# Patient Record
Sex: FEMALE | Race: WHITE | Employment: UNEMPLOYED | URBAN - METROPOLITAN AREA
[De-identification: names, ages, dates, MRNs, and addresses within clinical notes are randomized per-mention and may not be internally consistent; named-entity substitution may affect disease eponyms.]

---

## 2017-01-01 ENCOUNTER — HOSPITAL ENCOUNTER (INPATIENT)
Age: 47
LOS: 3 days | DRG: 375 | End: 2017-01-04
Attending: INTERNAL MEDICINE | Admitting: INTERNAL MEDICINE
Payer: OTHER MISCELLANEOUS

## 2017-01-01 ENCOUNTER — HOME CARE VISIT (OUTPATIENT)
Dept: HOSPICE | Facility: HOSPICE | Age: 47
End: 2017-01-01
Payer: MEDICAID

## 2017-01-01 ENCOUNTER — APPOINTMENT (OUTPATIENT)
Dept: GENERAL RADIOLOGY | Age: 47
DRG: 375 | End: 2017-01-01
Attending: INTERNAL MEDICINE
Payer: OTHER MISCELLANEOUS

## 2017-01-01 ENCOUNTER — DOCUMENTATION ONLY (OUTPATIENT)
Dept: PALLATIVE CARE | Age: 47
End: 2017-01-01

## 2017-01-01 ENCOUNTER — HOSPICE ADMISSION (OUTPATIENT)
Dept: HOSPICE | Facility: HOSPICE | Age: 47
End: 2017-01-01
Payer: MEDICAID

## 2017-01-01 VITALS
SYSTOLIC BLOOD PRESSURE: 114 MMHG | HEART RATE: 108 BPM | RESPIRATION RATE: 18 BRPM | OXYGEN SATURATION: 95 % | DIASTOLIC BLOOD PRESSURE: 77 MMHG | TEMPERATURE: 97.4 F

## 2017-01-01 DIAGNOSIS — R10.9 INTRACTABLE ABDOMINAL PAIN: ICD-10-CM

## 2017-01-01 DIAGNOSIS — K56.609 SBO (SMALL BOWEL OBSTRUCTION) (HCC): ICD-10-CM

## 2017-01-01 PROBLEM — C18.9 COLON CANCER (HCC): Status: ACTIVE | Noted: 2017-01-01

## 2017-01-01 LAB
GLUCOSE BLD STRIP.AUTO-MCNC: 97 MG/DL (ref 65–100)
SERVICE CMNT-IMP: NORMAL

## 2017-01-01 PROCEDURE — C9113 INJ PANTOPRAZOLE SODIUM, VIA: HCPCS | Performed by: INTERNAL MEDICINE

## 2017-01-01 PROCEDURE — G0299 HHS/HOSPICE OF RN EA 15 MIN: HCPCS

## 2017-01-01 PROCEDURE — 74011250636 HC RX REV CODE- 250/636: Performed by: INTERNAL MEDICINE

## 2017-01-01 PROCEDURE — 0651 HSPC ROUTINE HOME CARE

## 2017-01-01 PROCEDURE — 74011000250 HC RX REV CODE- 250: Performed by: INTERNAL MEDICINE

## 2017-01-01 PROCEDURE — 82962 GLUCOSE BLOOD TEST: CPT

## 2017-01-01 PROCEDURE — 0656 HSPC GENERAL INPATIENT

## 2017-01-01 PROCEDURE — 65270000015 HC RM PRIVATE ONCOLOGY

## 2017-01-01 PROCEDURE — 3336500001 HSPC ELECTION

## 2017-01-01 PROCEDURE — 74011250637 HC RX REV CODE- 250/637: Performed by: INTERNAL MEDICINE

## 2017-01-01 PROCEDURE — 74000 XR ABD (KUB): CPT

## 2017-01-01 RX ORDER — ONDANSETRON 2 MG/ML
4 INJECTION INTRAMUSCULAR; INTRAVENOUS
Status: DISCONTINUED | OUTPATIENT
Start: 2017-01-01 | End: 2017-01-01 | Stop reason: SDUPTHER

## 2017-01-01 RX ORDER — DEXAMETHASONE 4 MG/1
2 TABLET ORAL EVERY 12 HOURS
Status: DISCONTINUED | OUTPATIENT
Start: 2017-01-01 | End: 2017-01-01

## 2017-01-01 RX ORDER — LORAZEPAM 2 MG/ML
0.5 INJECTION INTRAMUSCULAR EVERY 4 HOURS
Status: DISCONTINUED | OUTPATIENT
Start: 2017-01-01 | End: 2017-01-05 | Stop reason: HOSPADM

## 2017-01-01 RX ORDER — LORAZEPAM 2 MG/ML
1 CONCENTRATE ORAL
Status: DISCONTINUED | OUTPATIENT
Start: 2017-01-01 | End: 2017-01-01

## 2017-01-01 RX ORDER — PANTOPRAZOLE SODIUM 40 MG/1
40 TABLET, DELAYED RELEASE ORAL
Status: DISCONTINUED | OUTPATIENT
Start: 2017-01-01 | End: 2017-01-01

## 2017-01-01 RX ORDER — DEXAMETHASONE SODIUM PHOSPHATE 4 MG/ML
2 INJECTION, SOLUTION INTRA-ARTICULAR; INTRALESIONAL; INTRAMUSCULAR; INTRAVENOUS; SOFT TISSUE EVERY 12 HOURS
Status: DISCONTINUED | OUTPATIENT
Start: 2017-01-01 | End: 2017-01-01

## 2017-01-01 RX ORDER — ONDANSETRON 2 MG/ML
4 INJECTION INTRAMUSCULAR; INTRAVENOUS
Status: DISCONTINUED | OUTPATIENT
Start: 2017-01-01 | End: 2017-01-05 | Stop reason: HOSPADM

## 2017-01-01 RX ORDER — SODIUM CHLORIDE 0.9 % (FLUSH) 0.9 %
10 SYRINGE (ML) INJECTION AS NEEDED
Status: DISCONTINUED | OUTPATIENT
Start: 2017-01-01 | End: 2017-01-05 | Stop reason: HOSPADM

## 2017-01-01 RX ORDER — ALPRAZOLAM 0.25 MG/1
0.25 TABLET ORAL
Status: DISCONTINUED | OUTPATIENT
Start: 2017-01-01 | End: 2017-01-01

## 2017-01-01 RX ORDER — ONDANSETRON 2 MG/ML
4 INJECTION INTRAMUSCULAR; INTRAVENOUS EVERY 8 HOURS
Status: DISCONTINUED | OUTPATIENT
Start: 2017-01-01 | End: 2017-01-01

## 2017-01-01 RX ORDER — SODIUM CHLORIDE 9 MG/ML
75 INJECTION, SOLUTION INTRAVENOUS CONTINUOUS
Status: DISCONTINUED | OUTPATIENT
Start: 2017-01-01 | End: 2017-01-01

## 2017-01-01 RX ORDER — LORAZEPAM 2 MG/ML
1 INJECTION INTRAMUSCULAR
Status: DISCONTINUED | OUTPATIENT
Start: 2017-01-01 | End: 2017-01-05 | Stop reason: HOSPADM

## 2017-01-01 RX ORDER — HYDROMORPHONE HYDROCHLORIDE 2 MG/ML
1 INJECTION, SOLUTION INTRAMUSCULAR; INTRAVENOUS; SUBCUTANEOUS
Status: DISCONTINUED | OUTPATIENT
Start: 2017-01-01 | End: 2017-01-01

## 2017-01-01 RX ORDER — LORAZEPAM 2 MG/ML
0.5 INJECTION INTRAMUSCULAR
Status: DISCONTINUED | OUTPATIENT
Start: 2017-01-01 | End: 2017-01-05 | Stop reason: HOSPADM

## 2017-01-01 RX ORDER — HYDROMORPHONE HYDROCHLORIDE 1 MG/ML
1 INJECTION, SOLUTION INTRAMUSCULAR; INTRAVENOUS; SUBCUTANEOUS ONCE
Status: COMPLETED | OUTPATIENT
Start: 2017-01-01 | End: 2017-01-01

## 2017-01-01 RX ORDER — FACIAL-BODY WIPES
10 EACH TOPICAL DAILY PRN
Status: DISCONTINUED | OUTPATIENT
Start: 2017-01-01 | End: 2017-01-05 | Stop reason: HOSPADM

## 2017-01-01 RX ORDER — HYDROMORPHONE HCL IN 0.9% NACL 15 MG/30ML
PATIENT CONTROLLED ANALGESIA VIAL INTRAVENOUS CONTINUOUS
Status: DISCONTINUED | OUTPATIENT
Start: 2017-01-01 | End: 2017-01-05 | Stop reason: HOSPADM

## 2017-01-01 RX ORDER — ONDANSETRON 2 MG/ML
8 INJECTION INTRAMUSCULAR; INTRAVENOUS EVERY 8 HOURS
Status: DISCONTINUED | OUTPATIENT
Start: 2017-01-01 | End: 2017-01-05 | Stop reason: HOSPADM

## 2017-01-01 RX ORDER — HYDROMORPHONE HYDROCHLORIDE 1 MG/ML
1 INJECTION, SOLUTION INTRAMUSCULAR; INTRAVENOUS; SUBCUTANEOUS
Status: DISCONTINUED | OUTPATIENT
Start: 2017-01-01 | End: 2017-01-05 | Stop reason: HOSPADM

## 2017-01-01 RX ADMIN — HYDROMORPHONE HYDROCHLORIDE 1 MG: 1 INJECTION, SOLUTION INTRAMUSCULAR; INTRAVENOUS; SUBCUTANEOUS at 03:29

## 2017-01-01 RX ADMIN — ONDANSETRON 8 MG: 2 INJECTION INTRAMUSCULAR; INTRAVENOUS at 15:10

## 2017-01-01 RX ADMIN — SODIUM CHLORIDE 40 MG: 9 INJECTION INTRAMUSCULAR; INTRAVENOUS; SUBCUTANEOUS at 08:30

## 2017-01-01 RX ADMIN — ONDANSETRON 4 MG: 2 INJECTION INTRAMUSCULAR; INTRAVENOUS at 23:01

## 2017-01-01 RX ADMIN — SODIUM CHLORIDE 75 ML/HR: 900 INJECTION, SOLUTION INTRAVENOUS at 01:49

## 2017-01-01 RX ADMIN — Medication: at 17:45

## 2017-01-01 RX ADMIN — LORAZEPAM 0.5 MG: 2 INJECTION INTRAMUSCULAR; INTRAVENOUS at 23:18

## 2017-01-01 RX ADMIN — Medication 10 ML: at 20:41

## 2017-01-01 RX ADMIN — LORAZEPAM 0.5 MG: 2 INJECTION INTRAMUSCULAR; INTRAVENOUS at 05:08

## 2017-01-01 RX ADMIN — ALPRAZOLAM 0.25 MG: 0.25 TABLET ORAL at 10:52

## 2017-01-01 RX ADMIN — SODIUM CHLORIDE 5 MG: 9 INJECTION INTRAMUSCULAR; INTRAVENOUS; SUBCUTANEOUS at 23:29

## 2017-01-01 RX ADMIN — HYDROMORPHONE HYDROCHLORIDE 1 MG: 1 INJECTION, SOLUTION INTRAMUSCULAR; INTRAVENOUS; SUBCUTANEOUS at 17:10

## 2017-01-01 RX ADMIN — LORAZEPAM 1 MG: 2 INJECTION INTRAMUSCULAR; INTRAVENOUS at 15:59

## 2017-01-01 RX ADMIN — HYDROMORPHONE HYDROCHLORIDE 1 MG: 1 INJECTION, SOLUTION INTRAMUSCULAR; INTRAVENOUS; SUBCUTANEOUS at 01:22

## 2017-01-01 RX ADMIN — DEXAMETHASONE SODIUM PHOSPHATE 2 MG: 4 INJECTION, SOLUTION INTRAMUSCULAR; INTRAVENOUS at 08:30

## 2017-01-01 RX ADMIN — LORAZEPAM 0.5 MG: 2 INJECTION INTRAMUSCULAR; INTRAVENOUS at 10:43

## 2017-01-01 RX ADMIN — DEXAMETHASONE SODIUM PHOSPHATE 2 MG: 4 INJECTION, SOLUTION INTRAMUSCULAR; INTRAVENOUS at 08:39

## 2017-01-01 RX ADMIN — ONDANSETRON 4 MG: 2 INJECTION INTRAMUSCULAR; INTRAVENOUS at 21:15

## 2017-01-01 RX ADMIN — LORAZEPAM 1 MG: 2 INJECTION INTRAMUSCULAR; INTRAVENOUS at 18:10

## 2017-01-01 RX ADMIN — ONDANSETRON 4 MG: 2 INJECTION INTRAMUSCULAR; INTRAVENOUS at 13:48

## 2017-01-01 RX ADMIN — Medication: at 17:16

## 2017-01-01 RX ADMIN — ONDANSETRON 4 MG: 2 INJECTION INTRAMUSCULAR; INTRAVENOUS at 08:30

## 2017-01-01 RX ADMIN — Medication: at 06:00

## 2017-01-01 RX ADMIN — LORAZEPAM 0.5 MG: 2 INJECTION INTRAMUSCULAR; INTRAVENOUS at 15:03

## 2017-01-01 RX ADMIN — ONDANSETRON 4 MG: 2 INJECTION INTRAMUSCULAR; INTRAVENOUS at 20:40

## 2017-01-01 RX ADMIN — LORAZEPAM 0.5 MG: 2 INJECTION INTRAMUSCULAR; INTRAVENOUS at 16:04

## 2017-01-01 RX ADMIN — DEXAMETHASONE 2 MG: 4 TABLET ORAL at 20:44

## 2017-01-01 RX ADMIN — ONDANSETRON 4 MG: 2 INJECTION INTRAMUSCULAR; INTRAVENOUS at 05:08

## 2017-01-01 RX ADMIN — HYDROMORPHONE HYDROCHLORIDE 1 MG: 1 INJECTION, SOLUTION INTRAMUSCULAR; INTRAVENOUS; SUBCUTANEOUS at 23:18

## 2017-01-01 RX ADMIN — ONDANSETRON 4 MG: 2 INJECTION INTRAMUSCULAR; INTRAVENOUS at 17:08

## 2017-01-01 RX ADMIN — LORAZEPAM 0.5 MG: 2 INJECTION INTRAMUSCULAR; INTRAVENOUS at 21:16

## 2017-01-01 RX ADMIN — ONDANSETRON 4 MG: 2 INJECTION INTRAMUSCULAR; INTRAVENOUS at 03:29

## 2017-01-01 RX ADMIN — SODIUM CHLORIDE 75 ML/HR: 900 INJECTION, SOLUTION INTRAVENOUS at 19:04

## 2017-01-01 RX ADMIN — HYDROMORPHONE HYDROCHLORIDE 1 MG: 1 INJECTION, SOLUTION INTRAMUSCULAR; INTRAVENOUS; SUBCUTANEOUS at 20:44

## 2017-01-01 RX ADMIN — Medication: at 22:47

## 2017-01-01 RX ADMIN — LORAZEPAM 0.5 MG: 2 INJECTION INTRAMUSCULAR; INTRAVENOUS at 01:29

## 2017-01-01 RX ADMIN — DEXAMETHASONE SODIUM PHOSPHATE 2 MG: 4 INJECTION, SOLUTION INTRAMUSCULAR; INTRAVENOUS at 20:40

## 2017-01-01 RX ADMIN — ONDANSETRON 4 MG: 2 INJECTION INTRAMUSCULAR; INTRAVENOUS at 14:39

## 2017-01-01 RX ADMIN — HYDROMORPHONE HYDROCHLORIDE 1 MG: 1 INJECTION, SOLUTION INTRAMUSCULAR; INTRAVENOUS; SUBCUTANEOUS at 22:25

## 2017-01-01 RX ADMIN — SODIUM CHLORIDE 40 MG: 9 INJECTION INTRAMUSCULAR; INTRAVENOUS; SUBCUTANEOUS at 08:39

## 2017-01-01 RX ADMIN — BISACODYL 10 MG: 10 SUPPOSITORY RECTAL at 23:20

## 2017-01-01 RX ADMIN — ONDANSETRON 4 MG: 2 INJECTION INTRAMUSCULAR; INTRAVENOUS at 10:52

## 2017-01-01 NOTE — H&P
[facility] Help to Those in Need  [phone]    Patient Name: Sharona Nicole  YOB: 1970    Date of Provider Hospice Visit: 01/01/17    Level of Care:   [x] General Inpatient (GIP)    [] Routine   [] Respite    Location of Care:  [] [facility] [] [facility] [x] [facility] [] [facility] [] [facility]  [] Home [] Other:      Date of Hospice Admission: 1-1-17  Hospice Attending: Dr. Marimar Addison Diagnosis: Metastatic colon cancer  Diagnoses RELATED to the terminal prognosis: partial small bowel obstruction, b/l ovarian masses, peritoneal carcinomatosis  Other Diagnoses: abdominal ascites     HOSPICE NARRATIVE COMPOSED BY PHYSICIAN   Rationale for a prognosis of life expectancy of 6 months or less if the disease follows its normal course:  Sharona Nicole is a 51y.o. year old who was admitted to [address] [place] [address]. The patient's principle diagnosis of metastatic colon cancer  has resulted in severe uncontrolled pain and nausea, PSBO with 2 hospitalizations for pain and PSBO last month and ED visit 2 days ago. Emesis x 2 today. Pt Functionally, the patient's Palliative Performance Scale has declined over a period of weeks and is estimated at 50. Objective information that support this patients limited prognosis includes: CT scan 12/23/16   IMPRESSION  IMPRESSION:   Findings are consistent with early small bowel obstruction with transition point  in the pelvis. The ascending colon is decompressed. Bilateral ovarian masses  with peritoneal carcinomatosis and increased complex ascites.        The patient/family chose comfort measures with the support of Hospice.     Attestation: I confirm that I composed this narrative as the physician and is based on my review of the patient's medical record and/or examination of the        HOSPICE DIAGNOSES   Active Symptoms:  1. Severe lower abdominal pain  2. Nausea  3. anxiety  4. Distended abdomen  5. Hx PSBO     PLAN        1.  Admit GIP hospice for severe pain , nausea, abdominal distention not controlled with oxycontin, dilaudid and  zofran  2. Iv dilaudid 1mg now and then start PCA dilaudid -.4 mg/hr with 0.4 mg bolus q10 min prn  3. Iv zofran with dilaudid now and then prn  4. NPO  5. Start IVF  6. Will ask Dr. Mayra Cabezas or associate to see in am . Pt/family reports that was to have f/u Dr. Mayra Cabezas next week. Discuss goals of care again. 7.   and SW to support family needs  8. Disposition: to home with hospice when sxs controlled    Prognosis estimated based on 01/01/17 clinical assessment is:   [] Few to Many Hours  [] Few to Many Days    [x] Few to Many Weeks    [] Few to Many Months    Communicated plan of care with: Hospice Case Manager; Hospice IDT; Care Team     GOALS OF CARE     Resuscitation Status: DNR  Durable DNR: [] Yes [] No    Advance Care Planning 12/23/2016   Patient's Healthcare Decision Maker is: Verbal statement (Legal Next of Kin remains as decision maker)   Primary Decision Maker Name Anant Gutiérrez   Primary Decision Maker Phone Number [phone]   Primary Decision Maker Relationship to Patient Parent   Confirm Advance Directive Yes, on file   Does the patient have other document types Power of [place]; Do Not Resuscitate        HISTORY     History obtained from: chart, patient and family    CHIEF COMPLAINT: pain and nausea and anxiety  The patient is:   [x] Verbal  [] Nonverbal  [] Unresponsive    HPI/SUBJECTIVE:      Went to Ed 2 days ago for pain and nausea. Sent home and feeling worse. Pain in abdomen in 9/10  emsis x 2 today. Unable to eat last 3 days.  Taking oxycontin 40mg bid and dilaudid 8 mg q4 hrs at home  Pt elects hospice care \" nothing further can be done\"  Pain rating 7/10 after dilaudid IV given       REVIEW OF SYSTEMS     The following systems were: [x] reviewed  [] unable to be reviewed    Positive ROS include:  Constitutional:  Ears/nose/mouth/throat:  Respiratory:  Gastrointestinal: pain and distenesion and nausea  Musculoskeletal:  Neurologic:anxious  Psychiatric:  Endocrine:     Adult Non-Verbal Pain Assessment Score: 7    Face  [] 0   No particular expression or smile  [] 1   Occasional grimace, tearing, frowning, wrinkled forehead  [x] 2   Frequent grimace, tearing, frowning, wrinkled forehead    Activity (movement)  [] 0   Lying quietly, normal position  [] 1   Seeking attention through movement or slow, cautious movement  [x] 2   Restless, excessive activity and/or withdrawal reflexes    Guarding  [] 0   Lying quietly, no positioning of hands over areas of body  [] 1   Splinting areas of the body, tense  [x] 2   Rigid, stiff    Physiology (vital signs)  [] 0   Stable vital signs  [x] 1   Change in any of the following: SBP > 20mm Hg; HR > 20/minute  [] 2   Change in any of the following: SBP > 30mm Hg; HR > 25/minute    Respiratory  [x] 0   Baseline RR/SpO2, compliant with ventilator  [] 1   RR > 10 above baseline, or 5% drop SpO2, mild asynchrony with ventilator  [] 2   RR > 20 above baseline, or 10% drop SpO2, asynchrony with ventilator     FUNCTIONAL ASSESSMENT     Palliative Performance Scale (PPS):50     PSYCHOSOCIAL/SPIRITUAL ASSESSMENT     Active Problems:    Colon cancer (HCC) (2017)      Past Medical History   Diagnosis Date    Cancer [facility])      Colon 3C    [facility]varian [facility] [address]     Psychiatric disorder      Clinical Depression      Past Surgical History   Procedure Laterality Date    Hx  section      Hx appendectomy      Hx bunionectomy      Hx septoplasty      Hx colectomy       12 inches removed, no colostomy      Social History   Substance Use Topics    Smoking status: Never Smoker    Smokeless tobacco: Never Used    Alcohol use No      Comment: Denies at this time.       Family History   Problem Relation Age of Onset    Psychiatric Disorder Mother     Hypertension Mother     Arthritis-osteo Father     Diabetes Brother     Hypertension Brother     Headache Maternal Aunt       Allergies   Allergen Reactions    Asparagus Nausea and Vomiting    Codeine Photosensitivity    Other Medication Rash     oxaliplatin    Percocet [Oxycodone-Acetaminophen] Other (comments)     Hallucinations       Current Facility-Administered Medications   Medication Dose Route Frequency    ondansetron (ZOFRAN) injection 4 mg  4 mg IntraVENous Q4H PRN    HYDROmorphone (PF) (DILAUDID) injection 1 mg  1 mg IntraVENous Q1H PRN    HYDROmorphone (PF) 15 mg/30 ml (DILAUDID) PCA   IntraVENous CONTINUOUS    0.9% sodium chloride infusion  75 mL/hr IntraVENous CONTINUOUS        PHYSICAL EXAM     Wt Readings from Last 3 Encounters:   12/30/16 135 lb (61.2 kg)   12/23/16 131 lb 6.3 oz (59.6 kg)   12/04/16 135 lb (61.2 kg)       Visit Vitals    /87 (BP 1 Location: Left arm, BP Patient Position: At rest)    Pulse (!) 106    Temp 97.5 °F (36.4 °C)    Resp 16    SpO2 97%       Supplemental O2  [] Yes  [x] NO  Last bowel movement:     Currently this patient has:  [] Peripheral IV [] PICC  [x] PORT [] ICD    [] Amin Catheter [] NG Tube   [] PEG Tube    [] Rectal Tube [] Drain  [] Other:     Constitutional: restless , clutching abdomen and moaning  Eyes: perrl  ENMT: clear rey  Cardiovascular: rrr  Respiratory: clear  Gastrointestinal: distended upper abdomen, firm  Musculoskeletal:ankle edema b/l  Skin: warm, dry  Neurologic: a x o x 3  Psychiatric:   Other:    Pertinent Lab and or Imaging Tests:  Lab Results   Component Value Date/Time    Sodium 136 12/30/2016 07:30 PM    Potassium 3.7 12/30/2016 07:30 PM    Chloride 97 12/30/2016 07:30 PM    CO2 28 12/30/2016 07:30 PM    Anion gap 11 12/30/2016 07:30 PM    Glucose 134 12/30/2016 07:30 PM    BUN 8 12/30/2016 07:30 PM    Creatinine 0.74 12/30/2016 07:30 PM    BUN/Creatinine ratio 11 12/30/2016 07:30 PM    GFR est AA >60 12/30/2016 07:30 PM    GFR est non-AA >60 12/30/2016 07:30 PM    Calcium 9.4 12/30/2016 07:30 PM     Lab Results Component Value Date/Time    Protein, total 8.5 12/30/2016 07:30 PM    Albumin 3.6 12/30/2016 07:30 PM           Total time: 60 min  Counseling / coordination time: d/w palliative MD hospice RN, floor RN, pt, family and on call oncologist  > 50% counseling / coordination?: yes

## 2017-01-01 NOTE — CERTIFICATE OF TERMINAL ILLNESS
Hospice Physician Admission Narrative   (Certification of Terminal Illness)    [facility]  Good Help to Those in Need  [phone]    Lisa Bullock    Date of Hospice Admission: 1-1-17  Hospice Attending: Dr. Vijay Nicholas Diagnosis: Metastatic colon cancer  Diagnoses RELATED to the terminal prognosis: partial small bowel obstruction, b/l ovarian masses, peritoneal carcinomatosis  Other Diagnoses: abdominal ascites     HOSPICE NARRATIVE COMPOSED BY PHYSICIAN   Rationale for a prognosis of life expectancy of 6 months or less if the disease follows its normal course:    Lisa Bullock is a 51y.o. year old who was admitted to [facility]. The patient's principle diagnosis of metastatic colon cancer  has resulted in severe uncontrolled pain and nausea, PSBO with 2 hospitalizations for pain and PSBO last month and ED visit 2 days ago. Emesis x 2 today. Pt Functionally, the patient's Palliative Performance Scale has declined over a period of weeks and is estimated at 50. Objective information that support this patients limited prognosis includes: CT scan 12/23/16   IMPRESSION  IMPRESSION:   Findings are consistent with early small bowel obstruction with transition point  in the pelvis. The ascending colon is decompressed. Bilateral ovarian masses  with peritoneal carcinomatosis and increased complex ascites. The patient/family chose comfort measures with the support of Hospice. Attestation: I confirm that I composed this narrative as the physician and is based on my review of the patient's medical record and/or examination of the patient. ______________________________________________________________________    [facility] LCD for Hospice Diagnosis (.hospicecriteria. ..)  The LCD for Hospice for the admitted diagnosis of cancer includes:    [] Disease with distant metastases at presentation OR    [x] Progression from an earlier stage of disease to metastatic disease with either:   [x] a continued decline in spite of therapy   [x] patient declines further disease directed therapy    [] Certain cancers with poor prognoses (e.g. small cell lung cancer, brain cancer and pancreatic cancer) may be hospice eligible without fulfilling the other criteria in this section.

## 2017-01-01 NOTE — PROGRESS NOTES
Patient called me back. Continues to have increasing pain despite current management. Patient frustrated that pain not being controlled. We discussed all options to include me calling the Hospitalist for direct admission to hospital vs transition to Hospice care. Patient states very poor po intake, increasing abdominal distension and at this point, \"just wants to be knocked out\". After conferring with her her family, she would like to proceed with Hospice care. I then talked with Ketty Urbano and Dr. Mar Simpson about GIP admission. Talked with nursing supervisor and they will secure a bed for patient and Hospice team will admit. Patient will go to ED registration and then be directed to the bed.

## 2017-01-01 NOTE — PROGRESS NOTES
Oncology Nursing Communication Tool      6:52 PM  1/1/2017     Bedside shift change report given to Travis Norton RN (incoming nurse) by Cinthya Carter RN (outgoing nurse) on Ty Simpler a 51 y.o. female who was admitted on 1/1/2017  4:54 PM. Report included the following information SBAR and Kardex. Significant changes during shift: pca started. Issues for physician to address: none            Code Status: DNR     Infections: No current active infections     Allergies: Asparagus; Codeine; Other medication; and Percocet [oxycodone-acetaminophen]     Current diet: DIET NPO       Pain Controlled [] yes [x] no   Bowel Movement [] yes [x] no   Last Bowel Movement (date) ? Vital Signs:   Patient Vitals for the past 12 hrs:   Temp Pulse Resp BP SpO2   01/01/17 1545 97.5 °F (36.4 °C) (!) 106 16 129/87 97 %      Intake & Output:   No intake or output data in the 24 hours ending 01/01/17 1852   Laboratory Results:   No results found for this or any previous visit (from the past 12 hour(s)). Opportunity for questions and clarifications were given to the incoming nurse. Patient's bed is in low position, side rails x2, door open PRN, call bell within reach and patient not in distress.       Cinthya Carter RN

## 2017-01-02 NOTE — HOSPICE
[facility]  Help to Those in Need  [phone]    Inpatient Nursing Admission   Patient Name: Spenser Gibson  YOB: 1970  Age: 51 y.o. Date of Hospice Admission: 1/1/2017  Hospice Attending: Reggie Mckenzie MD  Primary Care Physician: Bee Russo MD  Admitting RN:  Keshav Masters RN BSN [facility]  :  Sara Mcdaniel present     Level of Care (GIP/Routine/Respite): GIP  [facility]  Patient Room: [address] [place] [address]   (if patient transferred from [facility]): HOSPICE SUMMARY   ER Visits/ Hospitalizations in past year:   4 ER visits in the past month with most recent Friday night as well as at least one 2 day hospital stay a week ago all related per patient report to symptom management  Hospice Diagnosis: Metastatic colon cancer  Colon cancer ([facility] [address]  Colon cancer ([facility] [address]  Onset Date of Hospice Diagnosis:     Co-Morbidities:   Patient Active Problem List   Diagnosis Code    Single current episode of major depressive disorder F32.9    History of colon cancer in adulthood Z85.038    Clostridium difficile carrier Z22.1    Intractable abdominal pain R10.9    SBO (small bowel obstruction) (HCC) K56.69    Nausea R11.0    Anxiety F41.9    Depression F32.9    Colon cancer (HCC) C18.9     Diagnoses RELATED to the terminal prognosis: small bowel obstruction  Other Diagnoses: Severe abdominal distension, nausea, anxiety    Rationale for a prognosis of life expectancy of 6 months or less if the disease follows its normal course (Disease Specific History):     Per Dr Hickman Neat note:  Rationale for a prognosis of life expectancy of 6 months or less if the disease follows its normal course:  Spenser Gibson is a 51y.o. year old who was admitted to [facility].  The patient's principle diagnosis of metastatic colon cancer has resulted in severe uncontrolled pain and nausea, PSBO with 2 hospitalizations for pain and PSBO last month and ED visit 2 days ago. Emesis x 2 today. Pt Functionally, the patient's Palliative Performance Scale has declined over a period of weeks and is estimated at 50. Objective information that support this patients limited prognosis includes: CT scan 12/23/16   IMPRESSION  IMPRESSION:   Findings are consistent with early small bowel obstruction with transition point  in the pelvis. The ascending colon is decompressed. Bilateral ovarian masses  with peritoneal carcinomatosis and increased complex ascites.        The patient/family chose comfort measures with the support of Hospice. Prognosis estimated based on 01/01/17 clinical assessment is:   _ Few to Many Hours  _ Few to Many Days   x_ Few to Many Weeks    _ Few to Many Months  (Erase what you do not select)    ADVANCE CARE PLANNING (Complete in ACP Flow Sheet)   Code Status: DNR   Durable DNR: x_ Yes  _ No  Advance Care Planning 1/1/2017   Patient's Healthcare Decision Maker is: Verbal statement (Legal Next of Kin remains as decision maker)   Primary Decision Maker Name -   Primary Decision Maker Phone Number -   Primary Decision Maker Relationship to Patient -   Confirm Advance Directive Yes, not on file   Does the patient have other document types -        Service: _ Yes  _ No      _ Unknown  Appropriate for Fashion Movement Ceremony:  _ Yes     _ No  Presybeterian: UNITARIAN    ASSESSMENT    Quality Measure: Patient self-reports:  x_ Yes    _ No    SYMPTOMS: Pain, nausea, vomiting, constipation, reflux, anxiety    ESAS:   Time of Assessment: 5:00 pm  Pain (1-10):  9.5  Shortness of breath (1-10): 2  Nausea (1-10): 9  Constipation: x_ Yes  _ No    FALL RISK:  _ Low   _x Moderate    _ High    _ Not able to assess    KARNOFSKY: 50    PRESSURE ULCERS   Sean Scale (pressure ulcer risk):      FAST for all dementia:     Progression to DEPENDENCE WITH ADLs (include time frame):   Patient has been primarily independent for most ADLs.  Now due to symtpom load ambulating very short distances  _ Dependent for bathing: personal hygiene and grooming   _ Dependent for dressing: dressing and undressing   _ Dependent for transferring: movement and mobility   _ Dependent for toileting: continence-related tasks including control and hygiene   _ Dependent for eating: preparing food and feeding    CLINICAL INFORMATION   Mid-arm Circumference (MAC):        Wt Readings from Last 3 Encounters:   12/30/16 61.2 kg (135 lb)   12/23/16 59.6 kg (131 lb 6.3 oz)   12/04/16 61.2 kg (135 lb)     There is no height or weight on file to calculate BMI. Visit Vitals    /87 (BP 1 Location: Left arm, BP Patient Position: At rest)    Pulse (!) 106    Temp 97.5 °F (36.4 °C)    Resp 16    SpO2 97%       Currently this patient has:  x_ Supplemental O2 _ Peripheral IV _ PICC    x_ PORT   x_ Amin Catheter if needed  _ NG Tube   _ PEG Tube _ Ostomy    _ ICD: Has ICD been deactivated? SIGNS/PHYSICAL FINDINGS: Patient in hospital bed in kneeling postiion; doubled over in pain, nauseous, anxious, verbal limited due to symptom crisis/most especially unreleived nausea, vomiting. 53 yo woman moving all extremities, alert and oriented. Moaning in pain. Reporting pain/discomfort as a 9.5  Chest clear, right subclavian port palated, abdomen large, taut, no bowel sounds ausculated. Patient had not voided in several hours. Passed very small hard stool earlier today prior to admission. LAB VALUES  No results found for this visit on 01/01/17 (from the past 12 hour(s)). No results found for this visit on 01/01/17 (from the past 6 hour(s)). Lab Results   Component Value Date/Time    Protein, total 8.5 12/30/2016 07:30 PM    Albumin 3.6 12/30/2016 07:30 PM       ALLERGIES AND MEDICATIONS     Allergies:    Allergies   Allergen Reactions    Asparagus Nausea and Vomiting    Codeine Photosensitivity    Other Medication Rash     oxaliplatin    Percocet [Oxycodone-Acetaminophen] Other (comments)     Hallucinations Current Facility-Administered Medications   Medication Dose Route Frequency    HYDROmorphone (PF) (DILAUDID) injection 1 mg  1 mg IntraVENous Q1H PRN    HYDROmorphone (PF) 15 mg/30 ml (DILAUDID) PCA   IntraVENous CONTINUOUS    0.9% sodium chloride infusion  75 mL/hr IntraVENous CONTINUOUS    ondansetron (ZOFRAN) injection 4 mg  4 mg IntraVENous Q4H PRN    bisacodyl (DULCOLAX) suppository 10 mg  10 mg Rectal DAILY PRN    dexamethasone (DECADRON) 4 mg/mL injection 2 mg  2 mg IntraVENous Q12H    LORazepam (ATIVAN) injection 0.5 mg  0.5 mg IntraVENous Q1H PRN    [START ON 1/2/2017] pantoprazole (PROTONIX) 40 mg in sodium chloride 0.9 % 10 mL injection  40 mg IntraVENous DAILY    sodium chloride (NS) flush 10 mL  10 mL InterCATHeter PRN    prochlorperazine (COMPAZINE) with saline injection 5 mg  5 mg IntraVENous Q6H PRN       ASSESSMENT & PLAN     HOSPICE DIAGNOSES   Active Symptoms:  1. Severe lower abdominal pain  2. Nausea  3. anxiety  4. Distended abdomen/constipation  5. Hx PSBO  6. Reflux     PLAN           1. Admit GIP hospice for severe pain , nausea, abdominal distention not controlled with oxycontin, dilaudid and  zofran  2. Iv dilaudid 1mg now and then start PCA dilaudid -.4 mg/hr with 0.4 mg bolus q10 min prn  3. Iv zofran with dilaudid now and then prn  4. NPO  5. Start IVF  6. Consult ordered per Dr Lizzy Macias for  Dr. Tasia Cotto  or associate to see in am . Pt/family reports that was to have f/u Dr. Mert [place] next week. Discuss goals of care again. 7.  and SW to support family needs  8. Disposition: to home with hospice when sxs controlled   9. Bladder scan for non void for several hours and arredondo if needed  10. IV Protonix for reflux  11. Bisacodyl supp tonight  12.  Have team socialworker follow up regarding patient long term goals home hospice vs chemotherapy         CAREGIVER SUPPORT:  x_ Provided information on End of Life Care   _ Material Provided: Gone From My Sight or Journey's End     1. FALL INTERVENTIONS PROVIDED (if the fall risk is >10,  the intervention below was provided)  _ Implement/recommend assistive devices and encouraged their use (use full bed rails, etc)  x_ Implemented/recommended resources for alar, system (personal alarm, bed alarm, call bell, etc)  x_  Implemented/recommended environmental changes (remove hazards, lower bed, improper lighting, etc.)  x_ Implemented/recommended increased supervision/assistance  _ Scheduled PT and/or OT evaluation for mobility/transfer techniques and/or assistive device recommendations    2. Initial Bereavement POC  _  LOW RISK Indications: normal grief, good support, opens expression. _x MODERATE RISK Indications: grief normal but severe, depression, somatic distress, low support  _ HIGH RISK indications: Hx of mental illness, suicidal ideation, depression, somatic distress, excessive guilt or anger, multiple losses, other life crisis. 3. Oxygen SAFETY and Anticoagulation SAFETY: Only if being transferred to home environment    4. Copies of Election of Benefit and Hospice Consent:  _ Hospice Folder Provided  _ See [place] [id number] Records for past medical records    5. Spiritual Issues Identified:     6. Psych/ Social/ Emotional Issues Identified: Elderly parents very tearful reporting that they came out of [facility], bought a house, to care for patient and patient's son Salty Cabrera. Parents had not eaten all day:  Ordered dinner trays from dietary. Offered actived listening and requested hospice MATTY ferro to visit. Newt Gowers MSW agreed and arrived to offer support to family and patient. 7.  Care Coordination:   _ Dr. Aimee Martinez contacted and was present for admission, medication reconciliation, hospice services and treatment  MEDS: See medication list above  DME: Per hospital/hospice house  Supplies: Per hospital/hospice house  [place] [facility]:     9.   Hospice Team Orders  x_ Skilled Nurse -  daily while admitted to hospital / hospice house  x_ MSW  1 visit and then prn for assessment/evaluation for family support and need for volunteer services  X  1 visit and then prn

## 2017-01-02 NOTE — PROGRESS NOTES
Music Therapy Assessment    Santosh Thomas [id number]  [id number]    1970  51 y.o.  female    Patient Telephone Number: [phone] (home)   Buddhist Affiliation: Ghanshyam Decker   Language: English   Extended Emergency Contact Information  Primary Emergency Contact: [address]  Mobile Phone: [phone]  Relation: Mother   Patient Active Problem List    Diagnosis Date Noted    [facility] 01/01/2017    Nausea 12/24/2016    Anxiety 12/24/2016    Depression 12/24/2016    SBO (small bowel obstruction) ([facility] 12/23/2016    Clostridium difficile carrier 12/07/2016    Intractable abdominal pain 12/07/2016    History of colon cancer in adulthood 09/19/2016    Single current episode of major depressive disorder 09/16/2016        Date: 1/2/2017       Mental Status:  [x] Alert  [ ] Forgetful  [ ] Confused      Communication Status: [ ] Impaired Speech  [ ] Nonverbal      Physical Status: [ ] Hard of Hearing  [ ] Oxygen in use  [ ] Ambulatory   [ ] Ambulatory with assistance   [ ] Non-ambulatory -N/A     Music Preferences, Background: Patient has a wide variety of music genres she enjoys. She named upbeat dance music, New Age, Classical. She said she listens to music around the house and when exercising, so chooses music based on her mood. She took piano lessons as a child, tried the classical guitar in college, and sang in her college choir.      Clinical Problem to be addressed: Promote relaxation, decrease feelings of stress. Goal(s) met in session:  Physical/Pain management (Scale of 1-10):    Pre-session rating: Pt denied pain. Post-session rating: Pt denied pain.    [x] Increased relaxation   [  ] Regulated breathing patterns  [  ] Minimized physical distress   [  ] Decreased nausea discomfort     Emotional/Psychological:  [x] Expression of feelings   [  ] Decreased aggressive behavior   [  ] Decreased sadness   [  ] Increased range of coping skills     [  ] Improved mood    [  ] Decreased withdrawn behavior     Social:  [  ] Decreased feelings of isolation  [x] Positive social interaction   [  ] Improved strained family relationships:     Spiritual:  [x] Spiritual support    [x] Expressed peace     Techniques Utilized (Check all that apply):   [  ] Procedural support MT [x] Music for relaxation [  ] Patient preferred music  [  ] Elvi analysis  [  ] Song choice  [  ] Music for validation  [  ] Music listening  [  ] Progressive relaxation [x] Guided imagery  [  ] Yon Laming  [  ] Erum Roberts   [  ] Minerva Stout writing  [  ] Wilber Jose along   [  ] Kaur Ravindra  [  ] Sensory stimulation  [x] Active Listening  [  ] Music for spiritual support [  ] Making of CDs as gifts    Session Observations:  F/u visit; The patient (pt) was observed to be comfortably lying in bed. She shared she'd just received medications for pain and nausea. She chose to have music therapy for relaxation. This music therapist (MT) played the guitar in a finger picking pattern while speaking the pt through deep breathing exercises and guided meditation with imagery. The pt increased relaxation in response to these interventions, as evidenced by (AEB) closing her eyes, her breathing deepening and the rate slowing (as seen in the rising and falling of her chest slowing), and her facial muscles relaxing. Afterward, the pt increased self-expression, AEB sharing about what she experienced during the interventions, and sharing about being at peace with what she believes is a time of transition in her life eventually leading into the next phase of her life (afterlife). She expressed gratitude for the music therapy session. Will follow as able.     Leigh Ann Fernandez MT-BC (Music Therapist-Board Certified)  Spiritual Care Department  Referral-based service

## 2017-01-02 NOTE — HOSPICE
Per request of palliative care doctor, Dr Donnie Feliz patient is being admitted to hospice at [facility] level of care. At the time of this admission patient appears to be in extreme distress with complaint of pain, nausea, vomiting, anxiety, constipation and reflux reporting that symptoms have been severe for more that one week. Patient received in acute, severe distress. Patient instructed that hospice is comfort not cure and that chemo therapy is not usually done in hospice. Patient express understanding of her condition by stating that she is dying and that there is nothing that would help. Patient has accepted hospice services at [facility]. Paged hospice counselor for support. Patient is cared for by elderly patients who also is providing care for patient's son, Yudith Daigle. As patient has been followed by oncology, IP consult written for oncology. Also, consult written for gastroenterology regarding nausea, partial bowel obstruction. Plan: admitt to hospice. As hospice room not available, asked staff to find additional chairs. Also, ordered food from kitchen for elderly parents.

## 2017-01-02 NOTE — ROUTINE PROCESS
Oncology Nursing Communication Tool      7:24 AM  1/2/2017     Bedside and Verbal shift change report given to Marzena Galindo RN (incoming nurse) by Niurka Joyce RN (outgoing nurse) on Cassandra Conner a 51 y.o. female who was admitted on 1/1/2017  4:54 PM. Report included the following information SBAR, Kardex and MAR. Significant changes during shift: none      Issues for physician to address: none            Code Status: DNR     Infections: No current active infections     Allergies: Asparagus; Codeine; Other medication; and Percocet [oxycodone-acetaminophen]     Current diet: DIET NPO       Pain Controlled [x] yes [] no   Bowel Movement [x] yes [] no   Last Bowel Movement (date)     1/2/17            Vital Signs:   Patient Vitals for the past 12 hrs:   Temp Pulse Resp BP SpO2   01/02/17 0527 98.4 °F (36.9 °C) 95 16 128/83 95 %      Intake & Output:     Intake/Output Summary (Last 24 hours) at 01/02/17 0724  Last data filed at 01/02/17 0000   Gross per 24 hour   Intake              370 ml   Output              300 ml   Net               70 ml      Laboratory Results:   No results found for this or any previous visit (from the past 12 hour(s)). Opportunity for questions and clarifications were given to the incoming nurse. Patient's bed is in low position, side rails x2, door open PRN, call bell within reach and patient not in distress.       Niurka Joyce, RN

## 2017-01-02 NOTE — PROGRESS NOTES
Oncology Nursing Communication Tool      4:25 AM  1/2/2017     Bedside and Verbal shift change report given to Irene Valentine RN (incoming nurse) by Mauricio Salgado RN (outgoing nurse) on Sergio Jasso a 51 y.o. female who was admitted on 1/1/2017  4:54 PM. Report included the following information SBAR, Kardex, [facility], Recent Results and Med Rec Status. Significant changes during shift: none      Issues for physician to address: none          Code Status: DNR     Infections: No current active infections     Allergies: Asparagus; Codeine; Other medication; and Percocet [oxycodone-acetaminophen]     Current diet: DIET NPO       Pain Controlled [x] yes [] no   Bowel Movement [] yes [x] no   Last Bowel Movement (date) unknown            Vital Signs:   No data found. Intake & Output:     Intake/Output Summary (Last 24 hours) at 01/02/17 0425  Last data filed at 01/02/17 0000   Gross per 24 hour   Intake              370 ml   Output              300 ml   Net               70 ml      Laboratory Results:   No results found for this or any previous visit (from the past 12 hour(s)). Opportunity for questions and clarifications were given to the incoming nurse. Patient's bed is in low position, side rails x2, door open PRN, call bell within reach and patient not in distress.       Mauricio Salgado RN

## 2017-01-02 NOTE — PROGRESS NOTES
Music Therapy Assessment    Jeannie Hanson [id number]  [id number]    1970  51 y.o.  female    Patient Telephone Number: [phone] (home)   Buddhism Affiliation: Jose Roberto Martinez   Language: English   Extended Emergency Contact Information  Primary Emergency Contact: [address]  Mobile Phone: [phone]  Relation: Mother   Patient Active Problem List    Diagnosis Date Noted    [facility] ([facility] 01/01/2017    Nausea 12/24/2016    Anxiety 12/24/2016    Depression 12/24/2016    SBO (small bowel obstruction) ([facility] 12/23/2016    Clostridium difficile carrier 12/07/2016    Intractable abdominal pain 12/07/2016    History of colon cancer in adulthood 09/19/2016    Single current episode of major depressive disorder 09/16/2016        Date: 1/2/2017       Mental Status:   [x] Alert [  ] Forgetful [  ]  Confused     Communication Status: [  ] Impaired Speech [  ] Nonverbal     Physical Status:   [  ] Hard of Hearing [  ] Oxygen in use [  ] Ambulatory   [  ] Ambulatory with assistance  [  ] Non-ambulatory -N/A    Music Preferences, Background: Patient has a wide variety of music genres she enjoys. She named upbeat dance music, New Age, Classical. She said she listens to music around the house and when exercising, so chooses music based on her mood. She took piano lessons as a child, tried the classical guitar in college, and sang in her college choir.     Clinical Problem to be addressed: Promote relaxation, decrease feelings of stress. Goal(s) met in session: N/A: Please see Session Observations below.   Physical/Pain management (Scale of 1-10):    Pre-session rating ___________    Post-session rating __________   [  ] Increased relaxation   [  ] Regulated breathing patterns  [  ] Minimized physical distress   [  ] Decreased nausea discomfort     Emotional/Psychological:  Expression of feelings _____________ [  ] Decreased aggressive behavior   [  ] Decreased sadness   [ ] Increased range of coping skills     [  ] Improved mood    [  ] Decreased withdrawn behavior     Social:  [  ] Decreased feelings of isolation  [  ] Positive social interaction   [  ] Improved strained family relationships:     Spiritual:  [  ] Spiritual support    [  ] Expressed peace     Techniques Utilized (Check all that apply): N/A: Please see Session Observations below. [  ] Procedural support MT [  ] Music for relaxation [  ] Patient preferred music  [  ] Elvi analysis  [  ] Song choice  [  ] Music for validation  [  ] Music listening  [  ] Progressive relaxation [  ] Guided imagery  [  ] Leonette Level  [  ] TIMP   [  ] Byron Stands writing  [  ] Erin Ropes along   [  ] Eleanor Kim  [  ] Sensory stimulation  [  ] Active Listening  [  ] Music for spiritual support [  ] Making of CDs as gifts    Session Observations:  F/u visit; The patient (pt) was observed to be sitting up in bed with her mother and brother sitting in chairs at bedside. The pt requested the music therapist follow up for a session later in the afternoon once her mother and brother have left. Will follow as able.     ANNIE DiggsBC (Music Therapist-Board Certified)  Spiritual Care Department  Referral-based service

## 2017-01-02 NOTE — HOSPICE
[facility] Note: Met with patient and her father, Apoorva Gooden, to introduce self and discuss her goals for care and what is important to her. Koko Velásquez is lucid, rated her pain at a \"2\" and despite some abdominal discomfort, stated that this is the most comfortable she has been in some time. She shared that she has been to the ER \"four times in [facility]" and she would like to avoid pain crises, returning to the hospital. She is anxious about going home and having another pain crisis as has been the cycle with her. Discussed hospice care at some length today, including hospice at home. Yesterday patient was in much discomfort and not able to participate as much in conversation. Koko Velásquez is processing what course of \"treatment\" is best for her at this time. Discussed hospice/ comfort care vs chemotherapy. Explained that hospice is her choice. Patient shared that she is leaning towards hospice as she has been informed that \"this chemo is the last resort\". She feels that she has tried chemo,  had significant pain and this has been cyclical without any resolution (of this pain). She does not want to experience pain and return to the hospital. Patient has a 15year old son at home, who is being taken care of by her parents (elderly) and other friends. Discussed with patient that it would be her decision to stay with hospice / comfort vs choosing chemotherapy if indicated. She understands this and that she is a hospice patient currently. I explained that if she wanted to go the chemo route, that she could revoke hospice. Patient is awaiting a conversation with her oncologist and a GI physician to see what their recommendations are. Patient voices a discomfort in her abdomen area, she has a possible SBO but also signifcant distension. Patient's father, Apoorva Gooden, was quiet but did state that it has been difficult to watch his daughter be in such discomfort.  Patient was diagnosed about a year and a half ago with Colon Cancer. Patient would like to be home without pain and be able to be lucid, as she is now. Explained that we could aim for that and assist her at home if she chose hospice at home. It appears that GI MD will not be able to address her issues until tomorrow morning. Hospice will follow up after this for support and goals of patient, assist with discharge as needed. Patient would like to return home.

## 2017-01-02 NOTE — PROGRESS NOTES
Primary Nurse Emelyn Membreno RN and Sangeetha Watson RN performed a dual skin assessment on this patient No impairment noted  Sean score is 19

## 2017-01-02 NOTE — H&P
[facility] Help to Those in Need  [phone]    Patient Name: Luis E Santos  YOB: 1970    Date of Provider Hospice Visit: 01/02/17    Level of Care:   [x] General Inpatient (GIP)    [] Routine   [] Respite    Location of Care:  [] [facility] [] [facility] [x] [facility] [] [facility] [] [facility]  [] Home [] Other:      Date of Hospice Admission: 1-1-17  Hospice Attending: Dr. Arjun Pemberton Diagnosis: Metastatic colon cancer  Diagnoses RELATED to the terminal prognosis: partial small bowel obstruction, b/l ovarian masses, peritoneal carcinomatosis  Other Diagnoses: abdominal ascites     HOSPICE NARRATIVE COMPOSED BY PHYSICIAN   Rationale for a prognosis of life expectancy of 6 months or less if the disease follows its normal course:  Luis E Santos is a 51y.o. year old who was admitted to [facility]. The patient's principle diagnosis of metastatic colon cancer  has resulted in severe uncontrolled pain and nausea, PSBO with 2 hospitalizations for pain and PSBO last month and ED visit 2 days ago. Emesis x 2 today. Pt Functionally, the patient's Palliative Performance Scale has declined over a period of weeks and is estimated at 50. Objective information that support this patients limited prognosis includes: CT scan 12/23/16   IMPRESSION  IMPRESSION:   Findings are consistent with early small bowel obstruction with transition point  in the pelvis. The ascending colon is decompressed. Bilateral ovarian masses  with peritoneal carcinomatosis and increased complex ascites.        The patient/family chose comfort measures with the support of Hospice.     Attestation: I confirm that I composed this narrative as the physician and is based on my review of the patient's medical record and/or examination of the     Patient  rested well last night, only pushing PCA bolus once after midnight. Rates pain 1-2/10 now.   Thirsty want to drink something   Had 1 small emesis of mucous early last night--wants zofran scheduled  Had 1 small BM this morning     HOSPICE DIAGNOSES   Active Symptoms:  1. Severe lower abdominal pain  2. Nausea  3. anxiety  4. Distended abdomen  5. Hx PSBO     PLAN        1. Admit GIP hospice for severe pain , nausea, abdominal distention not controlled with oxycontin, dilaudid and  zofran  2. Iv dilaudid 1mg now and then start PCA dilaudid -.4 mg/hr with 0.4 mg bolus q10 min prn  3. Iv zofran with dilaudid now and then prn  4. NPO  5. continue IVF  6. Schedule zofran q8 hrs for nasuea  7. Will ask Dr. Juliann Wilcox or associate to see in am . Pt/family reports that was to have f/u Dr. Juliann Wilcox next week. Discuss goals of care again. 8.   and SW to support family needs  9. Disposition: to home with hospice when sxs controlled        Abdominal pain now controlled on PCA dilaudid - mg basal with bolus prn, can adjust if needed later today or tomorrow  Continue IVF and NPO for now  Will get GI opinion re: PSBO and ability take in oral med,foods and liquids and if needs to have a venting gastrostomy. Would appreciate assistance from GI MD  Will ask oncology to see Pt or pt will need to see Dr Juliann Wilcox again re: possible further chemo as chemo is tentatively scheduled for tomorrow. Appreciate assistance from on call oncologist.  Pt will likely need to have PCA upon discharge to home--failed oral medicines several times now. Now that pt is more settled and out of pain crisis needs to make an informed decision re: any further chemo or intervention such as G -tube vs going home with hospice. Prognosis estimated based on 01/02/17 clinical assessment is:   [] Few to Many Hours  [] Few to Many Days    [x] Few to Many Weeks    [] Few to Many Months    Communicated plan of care with: Hospice Case Manager;  Hospice IDT; Care Team     GOALS OF CARE     Resuscitation Status: DNR  Durable DNR: [] Yes [] No    Advance Care Planning 1/1/2017   Patient's Healthcare Decision Maker is: Verbal statement (Legal Next of Kin remains as decision maker)   Primary Decision Maker Name -   Primary Decision Maker Phone Number -   Primary Decision Maker Relationship to Patient -   Confirm Advance Directive Yes, not on file   Does the patient have other document types -        HISTORY     History obtained from: chart, patient and family    CHIEF COMPLAINT: pain and nausea and anxiety  The patient is:   [x] Verbal  [] Nonverbal  [] Unresponsive    HPI/SUBJECTIVE:      Went to Ed 2 days ago for pain and nausea. Sent home and feeling worse. Pain in abdomen in 9/10  emsis x 2 today. Unable to eat last 3 days.  Taking oxycontin 40mg bid and dilaudid 8 mg q4 hrs at home  Pt elects hospice care \" nothing further can be done\"  Pain rating 7/10 after dilaudid IV given       REVIEW OF SYSTEMS     The following systems were: [x] reviewed  [] unable to be reviewed    Positive ROS include:  Constitutional:  Ears/nose/mouth/throat:  Respiratory:  Gastrointestinal: pain and distenesion and nausea  Musculoskeletal:  Neurologic:anxious  Psychiatric:  Endocrine:     Adult Non-Verbal Pain Assessment Score: 7    Face  [] 0   No particular expression or smile  [] 1   Occasional grimace, tearing, frowning, wrinkled forehead  [x] 2   Frequent grimace, tearing, frowning, wrinkled forehead    Activity (movement)  [] 0   Lying quietly, normal position  [] 1   Seeking attention through movement or slow, cautious movement  [x] 2   Restless, excessive activity and/or withdrawal reflexes    Guarding  [] 0   Lying quietly, no positioning of hands over areas of body  [] 1   Splinting areas of the body, tense  [x] 2   Rigid, stiff    Physiology (vital signs)  [] 0   Stable vital signs  [x] 1   Change in any of the following: SBP > 20mm Hg; HR > 20/minute  [] 2   Change in any of the following: SBP > 30mm Hg; HR > 25/minute    Respiratory  [x] 0   Baseline RR/SpO2, compliant with ventilator  [] 1   RR > 10 above baseline, or 5% drop SpO2, mild asynchrony with ventilator  [] 2   RR > 20 above baseline, or 10% drop SpO2, asynchrony with ventilator     FUNCTIONAL ASSESSMENT     Palliative Performance Scale (PPS):50     PSYCHOSOCIAL/SPIRITUAL ASSESSMENT     Active Problems:    Colon cancer ([facility] (2017)      Past Medical History   Diagnosis Date    Cancer [facility])      Colon 3C    [facility]     Psychiatric disorder      Clinical Depression      Past Surgical History   Procedure Laterality Date    Hx  section      Hx appendectomy      Hx bunionectomy      Hx septoplasty      Hx colectomy       12 inches removed, no colostomy      Social History   Substance Use Topics    Smoking status: Never Smoker    Smokeless tobacco: Never Used    Alcohol use No      Comment: Denies at this time.       Family History   Problem Relation Age of Onset    Psychiatric Disorder Mother     Hypertension Mother     Arthritis-osteo Father     Diabetes Brother     Hypertension Brother     Headache Maternal Aunt       Allergies   Allergen Reactions    Asparagus Nausea and Vomiting    Codeine Photosensitivity    Other Medication Rash     oxaliplatin    Percocet [Oxycodone-Acetaminophen] Other (comments)     Hallucinations       Current Facility-Administered Medications   Medication Dose Route Frequency    HYDROmorphone (PF) (DILAUDID) injection 1 mg  1 mg IntraVENous Q1H PRN    HYDROmorphone (PF) 15 mg/30 ml (DILAUDID) PCA   IntraVENous CONTINUOUS    0.9% sodium chloride infusion  75 mL/hr IntraVENous CONTINUOUS    ondansetron (ZOFRAN) injection 4 mg  4 mg IntraVENous Q4H PRN    bisacodyl (DULCOLAX) suppository 10 mg  10 mg Rectal DAILY PRN    dexamethasone (DECADRON) 4 mg/mL injection 2 mg  2 mg IntraVENous Q12H    LORazepam (ATIVAN) injection 0.5 mg  0.5 mg IntraVENous Q1H PRN    pantoprazole (PROTONIX) 40 mg in sodium chloride 0.9 % 10 mL injection  40 mg IntraVENous DAILY    sodium chloride (NS) flush 10 mL  10 mL InterCATHeter PRN  prochlorperazine (COMPAZINE) with saline injection 5 mg  5 mg IntraVENous Q6H PRN        PHYSICAL EXAM     Wt Readings from Last 3 Encounters:   12/30/16 135 lb (61.2 kg)   12/23/16 131 lb 6.3 oz (59.6 kg)   12/04/16 135 lb (61.2 kg)       Visit Vitals    /83 (BP 1 Location: Left arm, BP Patient Position: At rest)    Pulse 95    Temp 98.4 °F (36.9 °C)    Resp 16    SpO2 95%       Supplemental O2  [] Yes  [x] NO  Last bowel movement:     Currently this patient has:  [] Peripheral IV [] PICC  [x] PORT [] ICD    [] Amin Catheter [] NG Tube   [] PEG Tube    [] Rectal Tube [] Drain  [] Other:     Constitutional: restless , clutching abdomen and moaning  Eyes: perrl  ENMT: clear rey  Cardiovascular: rrr  Respiratory: clear  Gastrointestinal: distended upper abdomen, firm  Musculoskeletal:ankle edema b/l  Skin: warm, dry  Neurologic: a x o x 3  Psychiatric:   Other:    Pertinent Lab and or Imaging Tests:  Lab Results   Component Value Date/Time    Sodium 136 12/30/2016 07:30 PM    Potassium 3.7 12/30/2016 07:30 PM    Chloride 97 12/30/2016 07:30 PM    CO2 28 12/30/2016 07:30 PM    Anion gap 11 12/30/2016 07:30 PM    Glucose 134 12/30/2016 07:30 PM    BUN 8 12/30/2016 07:30 PM    Creatinine 0.74 12/30/2016 07:30 PM    BUN/Creatinine ratio 11 12/30/2016 07:30 PM    GFR est AA >60 12/30/2016 07:30 PM    GFR est non-AA >60 12/30/2016 07:30 PM    Calcium 9.4 12/30/2016 07:30 PM     Lab Results   Component Value Date/Time    Protein, total 8.5 12/30/2016 07:30 PM    Albumin 3.6 12/30/2016 07:30 PM           Total time: 30 min  Counseling / coordination time: d/w palliative MD hospice RN, floor RN, pt, family and on call oncologist  > 50% counseling / coordination?: yes

## 2017-01-02 NOTE — HOSPICE
[address] [facility] Help to Those in Need  [phone]    Patient Name: Norma Laguna  YOB: 1970    Date of Provider Hospice Visit: 01/02/17    Level of Care:   [x] General Inpatient (GIP)    [] Routine   [] Respite    Location of Care:  [] [facility] [place] [] [facility] [x] [address] [] [facility] [] [facility]  [] Home [] Other:      Date of Hospice Admission: 1-1-17  Hospice Attending: Dr. Clarissa Koch Diagnosis: Metastatic colon cancer  Diagnoses RELATED to the terminal prognosis: partial small bowel obstruction, b/l ovarian masses, peritoneal carcinomatosis  Other Diagnoses: abdominal ascites     HOSPICE NARRATIVE COMPOSED BY PHYSICIAN   Rationale for a prognosis of life expectancy of 6 months or less if the disease follows its normal course:  Norma Laguna is a 51y.o. year old who was admitted to [facility]. The patient's principle diagnosis of metastatic colon cancer  has resulted in severe uncontrolled pain and nausea, PSBO with 2 hospitalizations for pain and PSBO last month and ED visit 2 days ago. Emesis x 2 today. Pt Functionally, the patient's Palliative Performance Scale has declined over a period of weeks and is estimated at 50. Objective information that support this patients limited prognosis includes: CT scan 12/23/16   IMPRESSION  IMPRESSION:   Findings are consistent with early small bowel obstruction with transition point  in the pelvis. The ascending colon is decompressed. Bilateral ovarian masses  with peritoneal carcinomatosis and increased complex ascites.        The patient/family chose comfort measures with the support of Hospice.     Attestation: I confirm that I composed this narrative as the physician and is based on my review of the patient's medical record and/or examination of the     Patient  rested well last night, only pushing PCA bolus once after midnight. Rates pain 1-2/10 now.   Thirsty want to drink something   Had 1 small emesis of mucous early last night--wants zofran scheduled  Had 1 small BM this morning     HOSPICE DIAGNOSES   Active Symptoms:  1. Severe lower abdominal pain  2. Nausea  3. anxiety  4. Distended abdomen  5. Hx PSBO     PLAN        1. Admit GIP hospice for severe pain , nausea, abdominal distention not controlled with oxycontin, dilaudid and  zofran  2. Iv dilaudid 1mg now and then start PCA dilaudid -.4 mg/hr with 0.4 mg bolus q10 min prn  3. Iv zofran with dilaudid now and then prn  4. NPO  5. continue IVF  6. Schedule zofran q8 hrs for nasuea  7. Will ask Dr. Ryan Serna or associate to see in am . Pt/family reports that was to have f/u Dr. Ryan Serna next week. Discuss goals of care again. 8.   and SW to support family needs  9. Disposition: to home with hospice when sxs controlled        Abdominal pain now controlled on PCA dilaudid - mg basal with bolus prn, can adjust if needed later today or tomorrow  Continue IVF and NPO for now  Will get GI opinion re: PSBO and ability take in oral med,foods and liquids and if needs to have a venting gastrostomy. Would appreciate assistance from GI MD  Will ask oncology to see Pt or pt will need to see Dr Ryan Serna again re: possible further chemo as chemo is tentatively scheduled for tomorrow. Appreciate assistance from on call oncologist.  Pt will likely need to have PCA upon discharge to home--failed oral medicines several times now. Now that pt is more settled and out of pain crisis needs to make an informed decision re: any further chemo or intervention such as G -tube vs going home with hospice. Prognosis estimated based on 01/02/17 clinical assessment is:   [] Few to Many Hours  [] Few to Many Days    [x] Few to Many Weeks    [] Few to Many Months    Communicated plan of care with: Hospice Case Manager;  Hospice IDT; Care Team     GOALS OF CARE     Resuscitation Status: DNR  Durable DNR: [] Yes [] No    Advance Care Planning 1/1/2017   Patient's Healthcare Decision Maker is: Verbal statement (Legal Next of Kin remains as decision maker)   Primary Decision Maker Name -   Primary Decision Maker Phone Number -   Primary Decision Maker Relationship to Patient -   Confirm Advance Directive Yes, not on file   Does the patient have other document types -        HISTORY     History obtained from: chart, patient and family    CHIEF COMPLAINT: pain and nausea and anxiety  The patient is:   [x] Verbal  [] Nonverbal  [] Unresponsive    HPI/SUBJECTIVE:      Went to Ed 2 days ago for pain and nausea. Sent home and feeling worse. Pain in abdomen in 9/10  emsis x 2 today. Unable to eat last 3 days.  Taking oxycontin 40mg bid and dilaudid 8 mg q4 hrs at home  Pt elects hospice care \" nothing further can be done\"  Pain rating 7/10 after dilaudid IV given       REVIEW OF SYSTEMS     The following systems were: [x] reviewed  [] unable to be reviewed    Positive ROS include:  Constitutional:  Ears/nose/mouth/throat:  Respiratory:  Gastrointestinal: pain and distenesion and nausea  Musculoskeletal:  Neurologic:anxious  Psychiatric:  Endocrine:     Adult Non-Verbal Pain Assessment Score: 7    Face  [] 0   No particular expression or smile  [] 1   Occasional grimace, tearing, frowning, wrinkled forehead  [x] 2   Frequent grimace, tearing, frowning, wrinkled forehead    Activity (movement)  [] 0   Lying quietly, normal position  [] 1   Seeking attention through movement or slow, cautious movement  [x] 2   Restless, excessive activity and/or withdrawal reflexes    Guarding  [] 0   Lying quietly, no positioning of hands over areas of body  [] 1   Splinting areas of the body, tense  [x] 2   Rigid, stiff    Physiology (vital signs)  [] 0   Stable vital signs  [x] 1   Change in any of the following: SBP > 20mm Hg; HR > 20/minute  [] 2   Change in any of the following: SBP > 30mm Hg; HR > 25/minute    Respiratory  [x] 0   Baseline RR/SpO2, compliant with ventilator  [] 1   RR > 10 above baseline, or 5% drop SpO2, mild asynchrony with ventilator  [] 2   RR > 20 above baseline, or 10% drop SpO2, asynchrony with ventilator     FUNCTIONAL ASSESSMENT     Palliative Performance Scale (PPS):50     PSYCHOSOCIAL/SPIRITUAL ASSESSMENT     Active Problems:    Colon cancer ([facility] [address] (2017)      Past Medical History   Diagnosis Date    Cancer [facility])      Colon 3C    [facility]     Psychiatric disorder      Clinical Depression      Past Surgical History   Procedure Laterality Date    Hx  section      Hx appendectomy      Hx bunionectomy      Hx septoplasty      Hx colectomy       12 inches removed, no colostomy      Social History   Substance Use Topics    Smoking status: Never Smoker    Smokeless tobacco: Never Used    Alcohol use No      Comment: Denies at this time.       Family History   Problem Relation Age of Onset    Psychiatric Disorder Mother     Hypertension Mother     Arthritis-osteo Father     Diabetes Brother     Hypertension Brother     Headache Maternal Aunt       Allergies   Allergen Reactions    Asparagus Nausea and Vomiting    Codeine Photosensitivity    Other Medication Rash     oxaliplatin    Percocet [Oxycodone-Acetaminophen] Other (comments)     Hallucinations       Current Facility-Administered Medications   Medication Dose Route Frequency    ondansetron (ZOFRAN) injection 4 mg  4 mg IntraVENous Q8H    HYDROmorphone (PF) (DILAUDID) injection 1 mg  1 mg IntraVENous Q1H PRN    HYDROmorphone (PF) 15 mg/30 ml (DILAUDID) PCA   IntraVENous CONTINUOUS    0.9% sodium chloride infusion  75 mL/hr IntraVENous CONTINUOUS    ondansetron (ZOFRAN) injection 4 mg  4 mg IntraVENous Q4H PRN    bisacodyl (DULCOLAX) suppository 10 mg  10 mg Rectal DAILY PRN    dexamethasone (DECADRON) 4 mg/mL injection 2 mg  2 mg IntraVENous Q12H    LORazepam (ATIVAN) injection 0.5 mg  0.5 mg IntraVENous Q1H PRN    pantoprazole (PROTONIX) 40 mg in sodium chloride 0.9 % 10 mL injection  40 mg IntraVENous DAILY  sodium chloride (NS) flush 10 mL  10 mL InterCATHeter PRN    prochlorperazine (COMPAZINE) with saline injection 5 mg  5 mg IntraVENous Q6H PRN        PHYSICAL EXAM     Wt Readings from Last 3 Encounters:   12/30/16 135 lb (61.2 kg)   12/23/16 131 lb 6.3 oz (59.6 kg)   12/04/16 135 lb (61.2 kg)       Visit Vitals    /83 (BP 1 Location: Left arm, BP Patient Position: At rest)    Pulse 95    Temp 98.4 °F (36.9 °C)    Resp 16    SpO2 95%       Supplemental O2  [] Yes  [x] NO  Last bowel movement:     Currently this patient has:  [] Peripheral IV [] PICC  [x] PORT [] ICD    [] Amin Catheter [] NG Tube   [] PEG Tube    [] Rectal Tube [] Drain  [] Other:     Constitutional: awake, alert and appears comfortable now  Eyes: perrl  ENMT: clear rey  Cardiovascular: rrr  Respiratory: clear  Gastrointestinal: distended upper abdomen, firm  Musculoskeletal:ankle edema b/l  Skin: warm, dry  Neurologic: a x o x 3  Psychiatric:   Other:    Pertinent Lab and or Imaging Tests:  Lab Results   Component Value Date/Time    Sodium 136 12/30/2016 07:30 PM    Potassium 3.7 12/30/2016 07:30 PM    Chloride 97 12/30/2016 07:30 PM    CO2 28 12/30/2016 07:30 PM    Anion gap 11 12/30/2016 07:30 PM    Glucose 134 12/30/2016 07:30 PM    BUN 8 12/30/2016 07:30 PM    Creatinine 0.74 12/30/2016 07:30 PM    BUN/Creatinine ratio 11 12/30/2016 07:30 PM    GFR est AA >60 12/30/2016 07:30 PM    GFR est non-AA >60 12/30/2016 07:30 PM    Calcium 9.4 12/30/2016 07:30 PM     Lab Results   Component Value Date/Time    Protein, total 8.5 12/30/2016 07:30 PM    Albumin 3.6 12/30/2016 07:30 PM           Total time: 30 min  Counseling / coordination time: d/w palliative MD hospice RN, floor RN, pt, family and on call oncologist  > 50% counseling / coordination?: yes

## 2017-01-02 NOTE — HOSPICE
[address] [facility] Help to Those in Need  [phone]    GIP Daily Nursing Note   Patient Name: Rabia Bar  YOB: 1970  Age: 51 y.o. Date of Visit: 01/02/17  Facility of Care: [facility]  Patient Room: [address]     Hospice Attending: Андрей Thomas MD  Hospice Diagnosis: Metastatic colon cancer  Colon cancer [facility])  Colon cancer ([address]    Level of Care: GIP  GIP Symptoms: pain, nausea, constipation, anxiety    Current GIP Symptoms    1. See above        ASSESSMENT & PLAN     1. Pain:  Due to abdominal distention. GI consult placed, will not be seen until tomorrow. ? Need for G tube placement for decompression. KUB ordered for today. Current PCA infusing at 0.3mg/hr with bolus 0.4mg every 6min as needed. Pt states her pain is well controled once PCA initiated on admission yesterday. 2. Nausea:  Pt required 2 PRN doses of zofran 4mg and compazine x 1 with incomplete relief. Zofran scheduled 4mg IV every 8 hrs with PRN availability. 3. Constipation. Reportedly sm BM this morning in response to yesterday's GA bisacodyl. 4. Anxiety:  2 PRN doses of 0.5mg IV ativan along with music therapy and low lighting to assist with a calm internal state. Spiritual Interventions: unitarian, visited by  from [facility]. Hospice chaplain to meet with pt for initial assessment. Psych/ Social/ Emotional Interventions: support provided by  Diane Nava and hospice/hospital team.  Pt transitioning to comfort oriented care, not wanting to continue cycle of ER visits. Care Coordination Needs: hospice team, staff RN family Re. Care plan and New Orders discussed / approved with Dana Bowen MD.    Description History and Chart Review   Narrative History of last 24 hours that demonstrates care cannot be provided in another setting:  Pt admitted in pain crisis requiring constant skilled assessment by MD and RN. Eventually pt placed on dilaudid PCA with good relief. Pt is now able to speak in full sentences, facial expression is relaxed. Nausea remains an issue despite frequent PRN doses of zofran and compazine, now on scheduled zofran. GI consult to evaluate need for decompression G-tube due to bowel obstruction. What has been done to control the patient's symptoms in the last 24 hours? See above    Does the patient currently require IV medications? yes  Does the patient currently require scheduled medications? yes  Does the patient currently require a PCA? yes    List number of doses of PRN medications in last 24 hours:  Medication 1:  Ativan 0.5mg  Number of doses:2    Medication 2:   Number of doses:    Medication 3:   Number of doses:    Supporting documentation for GIP need for pain control:  [x] Frequent evaluation by a doctor, nurse practitioner, nurse   [x] Frequent medication adjustment    [x] IVs that cannot be administered at home   [x] Aggressive pain management   [x] Complicated technical delivery of medications              Supporting documentation for GIP need for symptom control:  []  Sudden decline necessitating intensive nursing intervention  [x]  Uncontrolled / intractable nausea or vomiting   []  Pathological fractures  []  Advanced open wounds requiring frequent skilled care  [] Unmanageable respiratory distress  [] New or worsening delirium   [] Delirium with behavior issues: Is 24 hour caregiver present due to safety concerns with agitation? (yes/no)  [] Imminent death  with skilled nursing needs documented above    DISCHARGE PLANNING   Daily discharge planning required for GIP  1. Discharge Plan: home with hospice once stabilized  2. Patient/Family teaching: reviewed hospice philosophy and goals of care now that pt is more able to comprehend a detailed discussion. 3. Response to patient/family teaching: pt expressed understanding.   Chemotherapy continues to be scheduled    ASSESSMENT    KARNOFSKY: 40    Prognosis estimated based on 01/02/17 clinical assessment is:   [] Few to Many Hours  [] Hours to Days   [] Few to Many Days   [] Days to Weeks   [x] Few to Many Weeks   [] Weeks to Months   [] Few to Many Months    Quality Measure: Patient self-reports:  [x] Yes    [] No    ESAS:   Time of Assessment: 4:00pm  Pain (1-10):3  Fatigue (1-10): 0  Shortness of breath (1-10):0  Nausea (1-10): 6  Appetite (1-10):    Anxiety: (1-10):   Depression: (1-10):   Well-being: (1-10):   Constipation: _ Yes  _ No  LAST BM: 1/2    CLINICAL INFORMATION   Patient Vitals for the past 12 hrs:   Temp Pulse Resp BP SpO2   01/02/17 1345 97.8 °F (36.6 °C) 84 16 120/88 97 %   01/02/17 0527 98.4 °F (36.9 °C) 95 16 128/83 95 %       Currently this patient has:  [] Supplemental O2   [] IV    [] PICC      [x] PORT   [] NG Tube    [] PEG Tube   [] Ostomy     [] Amin draining _______ urine  [] Other:     SIGNS/PHYSICAL FINDINGS     Skin (including wound):  [x] Warm, dry, supple, intact and color normal for race  [] Warm   [] Dry   [] Cool     [] Clammy       [] Diaphoretic    Turgor   [] Normal   [] Decreased  Color:   [] Pink   [] Pale   [] Cyanotic   [] Erythema   [] Jaundice   [] Normal for Race  []  Wounds:    Neuro: a&o x 4  [] Lethargy  [] Restlessness / agitation  [] Confusion / delirium  [] Hallucinations  [] Responds to maximal stimulation  [] Unresponsive  [] Seizures     Cardiac:  [] Dyspnea on Exertion  [] JVD  [] Murmur  [] Palpitations  [] Hypotension  [] Hypertension  [] Tachycardia  [] Bradycardia  [] Irregular HR  [] Pulses Decreased  [] Pulses Absent  [] Edema:       (Location, Grade and Pitting)  [] Mottling:      (Location)    Respiratory:  Breath sounds:    [x] Diminished   [] Wheeze   [] Rhonchi   [] Rales   [x] Even and unlabored  [] Labored:            [] Cough   [] Non Productive   [] Productive    [] Description:           [] Deep suctioned   [] O2 at ___ LPM  [] High flow oxygen greater than 10 LPM  [] Bi-Pap    GI  [] Abdomen (describe)   [] Ascites  [x] Nausea  [x] Vomiting  [] Incontinent of bowels  [] Bowel sounds (yes/no)  [] Diarrhea  [x] Constipation (see above including last bowel movement)  [] Checked for impaction  [x] Last BM 1/2/16  Nutrition  Diet:____NPO______  Appetite:   [] Good   [] Fair   [] Poor   [] Tube Feeding       [x] Voiding  [] Incontinent   [] Amin    Musculoskeletal  [] Balance/Recluse Unsteady   [x] Weak   Strength:    [] Normal    [] Limited    [x] Decreasing   Activities:    [x] Up as tolerated   [] Bedridden    [] Specify:    SAFETY  [x] 24 hr. Caregiver   [x] Side rails ? [x] Hospital bed   [] Reviewed Falls & Safety       ALLERGIES AND MEDICATIONS     Allergies:    Allergies   Allergen Reactions    Asparagus Nausea and Vomiting    Codeine Photosensitivity    Other Medication Rash     oxaliplatin    Percocet [Oxycodone-Acetaminophen] Other (comments)     Hallucinations        Current Facility-Administered Medications   Medication Dose Route Frequency    ondansetron (ZOFRAN) injection 4 mg  4 mg IntraVENous Q8H    HYDROmorphone (PF) (DILAUDID) injection 1 mg  1 mg IntraVENous Q1H PRN    HYDROmorphone (PF) 15 mg/30 ml (DILAUDID) PCA   IntraVENous CONTINUOUS    0.9% sodium chloride infusion  75 mL/hr IntraVENous CONTINUOUS    ondansetron (ZOFRAN) injection 4 mg  4 mg IntraVENous Q4H PRN    bisacodyl (DULCOLAX) suppository 10 mg  10 mg Rectal DAILY PRN    dexamethasone (DECADRON) 4 mg/mL injection 2 mg  2 mg IntraVENous Q12H    LORazepam (ATIVAN) injection 0.5 mg  0.5 mg IntraVENous Q1H PRN    pantoprazole (PROTONIX) 40 mg in sodium chloride 0.9 % 10 mL injection  40 mg IntraVENous DAILY    sodium chloride (NS) flush 10 mL  10 mL InterCATHeter PRN    prochlorperazine (COMPAZINE) with saline injection 5 mg  5 mg IntraVENous Q6H PRN          Visit Time In: 4:00pm  Visit Time Out: 5:00

## 2017-01-03 NOTE — HOSPICE
[facility] RN note:  Pt in bed, alert and chatty with visitors (friend and her daughter.)  Pt requesting use of xanax po which works for her at home and feels that she is able to take po as she has not vomited since yesterday. Discussed with Dr Beryle Levee, previous order of po xanax ordered for use preferentially to IV ativan. Per pt, Dr Krzysztof Garland assessed pt and preliminarily does not recommend procedure for abdominal decompression but will return later today with more detailed information. Diet advanced to clear liquids per Dr Mary Zhong. Discussed pt with Dr Tori Maher who plans to meet with pt today to determine future plans for chemotherapy. Pain reportedly well controlled with continuous PCA and 4 PRN boluses through night.      4:00pm----Decision made not to continue with chemotherapy. Surgical intervention not an option per GI consult. Plan is for pt to go home with hospice tomorrow. Increased need for dilaudid boluses through the day, 12 boluses delivered, 3 denied. Basal rate increased to 0.5mg and bolus dose increased to 0.5 per dr Beryle Levee. Dilaudid PCA order faxed to [facility] for delivery of home infusion system by 11:00am.  Pt's parents plan to transport pt via personal vehicle early afternoon. Hospice RN to do a home \"tuck in\" visit. Pt requesting dietitian consult with home team to determine optimal diet to accommodate SBO. [facility]  [phone]    GIP Daily Nursing Note   Patient Name: Belkys Sanchez  YOB: 1970  Age: 51 y.o. Date of Visit: 01/03/17  Facility of Care: [facility]  Patient Room: [address] [place] [address] Attending: Андрей Mcguire MD  Hospice Diagnosis: Metastatic colon cancer  Colon cancer [facility])  Colon cancer ([facility] [address]    Level of Care: GIP  GIP Symptoms: pain, nausea/voming, constipation, anxiety    Current GIP Symptoms    1. Pain  2. Nausea  3. Constipation  4. anxiety        ASSESSMENT & PLAN   1.  Pain: Due to abdominal distention. GI consult placed, will not be seen until tomorrow. ? Need for G tube placement for decompression. KUB ordered for today. Current PCA infusing at 0.3mg/hr with bolus 0.4mg every 6min as needed. Pt states her pain is well controled once PCA initiated on admission yesterday. 2. Nausea: scheduled IV zofran effective to control nausea. 3. Constipation. 8 small BM's trending from formed to liquid. KUB showed continued sm bowel obstruction. 4. Anxiety: added xanax 0.25mg po which is effective for pt at home. Diet progressed to clear liquids.              Spiritual Interventions: unitarian, visited by  from home [facility]. Hospice chaplain to meet with pt for initial assessment.     Psych/ Social/ Emotional Interventions: support provided by  Nivia Preston and hospice/hospital team. Pt transitioning to comfort oriented care, not wanting to continue cycle of ER visits.      Care Coordination Needs: hospice team, staff RN family Sarah.     Care plan and New Orders discussed / approved with Sofi Cardenas MD.     Description History and Chart Review   Narrative History of last 24 hours that demonstrates care cannot be provided in another setting:   Pt requiring skilled assessment and medication management for optimal symptom management.     What has been done to control the patient's symptoms in the last 24 hours? See above     Does the patient currently require IV medications? yes  Does the patient currently require scheduled medications? yes  Does the patient currently require a PCA?  yes     List number of doses of PRN medications in last 24 hours:  Medication 1: Ativan 0.5mg  Number of doses:2     Medication 2: dilaudid boluses 0.4mg  Number of doses: 4 over 8 hrs     Medication 3:   Number of doses:     Supporting documentation for GIP need for pain control:  [x] Frequent evaluation by a doctor, nurse practitioner, nurse   [x] Frequent medication adjustment   [x] IVs that cannot be administered at home   [x] Aggressive pain management   [x] Complicated technical delivery of medications              Supporting documentation for GIP need for symptom control:  [] Sudden decline necessitating intensive nursing intervention  [x] Uncontrolled / intractable nausea or vomiting   [] Pathological fractures  [] Advanced open wounds requiring frequent skilled care  [] Unmanageable respiratory distress  [] New or worsening delirium   [] Delirium with behavior issues: Is 24 hour caregiver present due to safety concerns with agitation? (yes/no)  [] Imminent death  with skilled nursing needs documented above     DISCHARGE PLANNING   Daily discharge planning required for GIP  1. Discharge Plan: home with hospice once stabilized  2. Patient/Family teaching: reviewed hospice philosophy and goals of care now that pt is more able to comprehend a detailed discussion. 3. Response to patient/family teaching: pt expressed understanding. Chemotherapy continues to be scheduled     ASSESSMENT    KARNOFSKY: 40     Prognosis estimated based on 01/02/17 clinical assessment is:   [] Few to Many Hours  [] Hours to Days   [] Few to Many Days   [] Days to Weeks   [x] Few to Many Weeks   [] Weeks to Months   [] Few to Many Months     Quality Measure: Patient self-reports:  [x] Yes  [] No     ESAS:   Time of Assessment: 4:00pm  Pain (1-10):3  Fatigue (1-10): 0  Shortness of breath (1-10):0  Nausea (1-10): 6  Appetite (1-10):    Anxiety: (1-10):   Depression: (1-10):   Well-being: (1-10):   Constipation: _ Yes _ No  LAST BM: 1/2     CLINICAL INFORMATION   Patient Vitals for the past 12 hrs:  Visit Vitals    /82 (BP 1 Location: Left arm, BP Patient Position: At rest)    Pulse 75    Temp 97.8 °F (36.6 °C)    Resp 17    SpO2 98%      Currently this patient has:  [] Supplemental O2   [] IV    [] PICC     [x] PORT   [] NG Tube    [] PEG Tube   [] Ostomy   [] Amin draining _______ urine  [] Other:      SIGNS/PHYSICAL FINDINGS      Skin (including wound):  [x] Warm, dry, supple, intact and color normal for race  [] Warm   [] Dry   [] Cool    [] Clammy    [] Diaphoretic   Turgor  [] Normal  [] Decreased  Color:  [] Pink  [] Pale  [] Cyanotic  [] Erythema  [] Jaundice  [] Normal for Race  []  Wounds:     Neuro: a&o x 4  [] Lethargy  [] Restlessness / agitation  [] Confusion / delirium  [] Hallucinations  [] Responds to maximal stimulation  [] Unresponsive  [] Seizures      Cardiac:  [] Dyspnea on Exertion  [] JVD  [] Murmur  [] Palpitations  [] Hypotension  [] Hypertension  [] Tachycardia  [] Bradycardia  [] Irregular HR  [] Pulses Decreased  [] Pulses Absent  [] Edema:  (Location, Grade and Pitting)  [] Mottling: (Location)     Respiratory:  Breath sounds:   [x] Diminished  [] Wheeze  [] Rhonchi  [] Rales   [x] Even and unlabored  [] Labored:    [] Cough  [] Non Productive  [] Productive  [] Description:    [] Deep suctioned   [] O2 at ___ LPM  [] High flow oxygen greater than 10 LPM  [] Bi-Pap     GI  [] Abdomen (describe)   [] Ascites  [x] Nausea  [x] Vomiting  [] Incontinent of bowels  [] Bowel sounds (yes/no)  [] Diarrhea  [x] Constipation (see above including last bowel movement)  [] Checked for impaction  [x] Last BM 1/2/16  Nutrition  Diet:____NPO______  Appetite:   [] Good   [] Fair   [] Poor   [] Tube Feeding        [x] Voiding  [] Incontinent   [] Amin     Musculoskeletal  [] Balance/[place] Unsteady   [x] Weak   Strength:   [] Normal   [] Limited   [x] Decreasing   Activities:   [x] Up as tolerated  [] Bedridden   [] Specify:     SAFETY  [x] 24 hr. Caregiver   [x] Side rails ? [x] Hospital bed   [] Reviewed Falls & Safety         ALLERGIES AND MEDICATIONS      Allergies:          Allergies   Allergen Reactions    Asparagus Nausea and Vomiting    Codeine Photosensitivity    Other Medication Rash       oxaliplatin    Percocet [Oxycodone-Acetaminophen] Other (comments)       Hallucinations                 Current Facility-Administered Medications   Medication Dose Route Frequency    ondansetron (ZOFRAN) injection 4 mg 4 mg IntraVENous Q8H    HYDROmorphone (PF) (DILAUDID) injection 1 mg 1 mg IntraVENous Q1H PRN    HYDROmorphone (PF) 15 mg/30 ml (DILAUDID) PCA    IntraVENous CONTINUOUS    0.9% sodium chloride infusion 75 mL/hr IntraVENous CONTINUOUS    ondansetron (ZOFRAN) injection 4 mg 4 mg IntraVENous Q4H PRN    bisacodyl (DULCOLAX) suppository 10 mg 10 mg Rectal DAILY PRN    dexamethasone (DECADRON) 4 mg/mL injection 2 mg 2 mg IntraVENous Q12H    LORazepam (ATIVAN) injection 0.5 mg 0.5 mg IntraVENous Q1H PRN    pantoprazole (PROTONIX) 40 mg in sodium chloride 0.9 % 10 mL injection 40 mg IntraVENous DAILY    sodium chloride (NS) flush 10 mL 10 mL InterCATHeter PRN    prochlorperazine (COMPAZINE) with saline injection 5 mg 5 mg IntraVENous Q6H PRN             Visit Time In: 9:30  Visit Time Out: 10:30

## 2017-01-03 NOTE — HOSPICE
[facility] Note: Patient walking in hallway with her friend and friend's daughter, with her pain pump. Patient walked for about 30 minutes per her friends. Patient is alert and oriented, writes down notes as she doesn't want to forget anything. Patient shared that she wants to be able to go home and stated that she would like to play a board game with her family. She is still in pain intermittently and pushed her bolus once while I was in the room with her and \"twice while I was walking\". Discussed this with hospice RN who has increased doseage of the pain medication. Patient has had meetings with MDs and notes that I \"will probably go home with hospice\". She is going to have another meeting with Dr. Sameera Gomez for final thoughts. Patient shared details of how she will eventually decline and die. She would like to be able to be home in her final days. She still has much anxiety about adequate  pain management and likes to be in the know about everything. Her son has not been in the hospital to see her. She has had a childhood friend and this friend's daughter stay with her for several hours. The plan is to try to work on patient going home, perhaps tomorrow, once her medications are converted to oral (other than the PCA pump). Unable to discuss other goals that patient has as she was in pain. Validated her concerns, provided support and empathized with the fact that she does not want her 15 yo son and [de-identified] yo parents see her struggle and suffer.

## 2017-01-03 NOTE — PROGRESS NOTES
Patient Update    Pt requesting anxiety medication, states that she is unable to tolerate ativan requesting xanax. MD paged, awaiting call. No other needs at the moment, will continue to monitor.

## 2017-01-03 NOTE — CONSULTS
GI Consultation Note [facility])    NAME: Pooja Hopkins : 1970 MRN: [id number]   ATTG: Derik Haywood MD PCP: Josue Oh MD  Date/Time:  1/3/2017 9:21 AM  Subjective:   REASON FOR CONSULT:      Jess Monsalve is a 51 y.o.  female with metastatic colon adenoCA with peritoneal carcinomatosis and b/l ovarian mets, who I was asked to see for evaluation of nausea with small bowel obstruction. She had initially gone to ER 2d PTA with c/o abd pain and nausea which has since worsened with emesis on day of admission with worsening abdominal discomfort, not amenable to available OP narcotic analgesia. She was not initially passing stool, but now is having loose stool. She has had prior hospitalizations for recurrent PSBO. A CT scan done 16 noted findings consistent with early small bowel obstruction with transition point  in the pelvis. The ascending colon was decompressed. Bilateral ovarian masses with peritoneal carcinomatosis and increased complex ascites noted. Abd x-ray yesterday noted ongoing small bowel obstruction. At present she is having frequent small volume liquid stool and her pain is better controlled  with no further emesis, but mild nausea noted. She is aware that her prognosis is poor and she may not be a candidate for GI or operative intervention. Past Medical History   Diagnosis Date    Cancer [facility])      Colon 3C    Ovarian cancer ([facility] [address]     Psychiatric disorder      Clinical Depression      Past Surgical History   Procedure Laterality Date    Hx  section      Hx appendectomy      Hx bunionectomy      Hx septoplasty      Hx colectomy       12 inches removed, no colostomy     Social History   Substance Use Topics    Smoking status: Never Smoker    Smokeless tobacco: Never Used    Alcohol use No      Comment: Denies at this time.        Family History   Problem Relation Age of Onset    Psychiatric Disorder Mother     Hypertension Mother    [place] Arthritis-osteo Father     Diabetes Brother     Hypertension Brother     Headache Maternal Aunt       Allergies   Allergen Reactions    Asparagus Nausea and Vomiting    Codeine Photosensitivity    Other Medication Rash     oxaliplatin    Percocet [Oxycodone-Acetaminophen] Other (comments)     Hallucinations       Home Medications:  Prior to Admission Medications   Prescriptions Last Dose Informant Patient Reported? Taking? ALPRAZolam (XANAX) 0.25 mg tablet   No No   Sig: Take 1 Tab by mouth every six (6) hours as needed for Anxiety. Max Daily Amount: 1 mg. Bacillus coagulans (PROBIOTIC, B. COAGULANS,) 10 billion cell cpDR   Yes No   Sig: Take  by mouth daily. ESOMEPRAZOLE MAGNESIUM (NEXIUM PO)   Yes No   Sig: Take  by mouth. HYDROmorphone (DILAUDID) 4 mg tablet   No No   Sig: Take 1 Tab by mouth every three (3) hours as needed for Pain. Max Daily Amount: 32 mg. SENNOSIDES (SENNA LAX PO)   Yes No   Sig: Take  by mouth. ascorbic acid, vitamin C, (VITAMIN C) 500 mg tablet   Yes No   Sig: Take 500 mg by mouth daily. cephALEXin (KEFLEX) 500 mg capsule   No No   Sig: Take 1 Cap by mouth two (2) times a day. cholecalciferol, vitamin D3, (VITAMIN D3) 2,000 unit tab   Yes No   Sig: Take 2,000 Units by mouth daily. dexamethasone (DECADRON) 4 mg tablet   No No   Sig: Take 1 Tab by mouth two (2) times daily (with meals). magnesium 250 mg tab   Yes No   Sig: Take 250 mg by mouth daily. multivitamins-ca-iron-minerals [facility] DAILY MULTIVITAMIN) 18-0.4 mg tab   Yes No   Sig: Take  by mouth. ondansetron hcl (ZOFRAN, AS HYDROCHLORIDE,) 4 mg tablet   No No   Sig: Take 1 Tab by mouth every eight (8) hours as needed for Nausea. oxyCODONE ER (OXYCONTIN) 40 mg ER tablet   No No   Sig: Take 1 Tab by mouth every twelve (12) hours. Max Daily Amount: 80 mg.  Indications: CHRONIC PAIN, SEVERE PAIN      Facility-Administered Medications: None     Hospital medications:  Current Facility-Administered Medications   Medication Dose Route Frequency    ondansetron (ZOFRAN) injection 4 mg  4 mg IntraVENous Q8H    HYDROmorphone (PF) (DILAUDID) injection 1 mg  1 mg IntraVENous Q1H PRN    HYDROmorphone (PF) 15 mg/30 ml (DILAUDID) PCA   IntraVENous CONTINUOUS    0.9% sodium chloride infusion  75 mL/hr IntraVENous CONTINUOUS    ondansetron (ZOFRAN) injection 4 mg  4 mg IntraVENous Q4H PRN    bisacodyl (DULCOLAX) suppository 10 mg  10 mg Rectal DAILY PRN    dexamethasone (DECADRON) 4 mg/mL injection 2 mg  2 mg IntraVENous Q12H    LORazepam (ATIVAN) injection 0.5 mg  0.5 mg IntraVENous Q1H PRN    pantoprazole (PROTONIX) 40 mg in sodium chloride 0.9 % 10 mL injection  40 mg IntraVENous DAILY    sodium chloride (NS) flush 10 mL  10 mL InterCATHeter PRN    prochlorperazine (COMPAZINE) with saline injection 5 mg  5 mg IntraVENous Q6H PRN     REVIEW OF SYSTEMS:     [x]    Total of 11 systems reviewed as follows:  Const:  negative fever, negative chills, negative weight loss  Eyes:   negative diplopia or visual changes, negative eye pain  ENT:   negative coryza, negative sore throat  Resp:   negative cough, hemoptysis, dyspnea  Cards:  negative for chest pain, palpitations, lower extremity edema  :  negative for frequency, dysuria and hematuria  Skin:   negative for rash and pruritus  Heme:  negative for easy bruising and gum/nose bleeding  MS:  negative for myalgias, arthralgias, back pain and muscle weakness  Neurolo:  negative for headaches, dizziness, vertigo, memory problems   Psych:  negative for feelings of anxiety, depression     Pertinent Positives include :    Objective:   VITALS:    Visit Vitals    /82 (BP 1 Location: Left arm, BP Patient Position: At rest)    Pulse 75    Temp 97.8 °F (36.6 °C)    Resp 17    SpO2 98%     Temp (24hrs), Av.1 °F (36.7 °C), Min:97.8 °F (36.6 °C), Max:98.6 °F (37 °C)    PHYSICAL EXAM:   General:    Alert, cooperative, no distress, appears stated age.      Head:   Normocephalic, without obvious abnormality, atraumatic. Eyes:   Conjunctivae clear, anicteric sclerae. Pupils are equal  Nose:  Nares normal. No drainage or sinus tenderness. Throat:    Lips, mucosa, and tongue normal.  No Thrush  Neck:  Supple, symmetrical,  no adenopathy, thyroid: non tender  Back:    Symmetric,  No CVA tenderness. Lungs:   CTA bilaterally. No wheezing/rhonchi/rales. Chest wall:  No tenderness or deformity. No Accessory muscle use. Heart:   Regular rate and rhythm,  no murmur, rub or gallop. Abdomen:   Soft, NT. Mildly distended. Tympanitic. Occasional BS. No masses  Extremities: Atraumatic, No cyanosis. No edema. No clubbing  Skin:     Texture, turgor normal. No rashes/lesions/jaundice  Lymph: Cervical, supraclavicular normal.  Psych:  Good insight. Not depressed. Not anxious or agitated. Neurologic: EOMs intact. Normal  strength, A/O X 3. LAB DATA REVIEWED:    Recent Results (from the past 48 hour(s))   GLUCOSE, POC    Collection Time: 01/02/17  1:57 PM   Result Value Ref Range    Glucose (POC) 97 65 - 100 mg/dL    Performed by Katherine Michael      IMAGING RESULTS:   [x]      I have personally reviewed the actual   [x]    CXR/ABDXR  [x]    CT  []     US  Abd Xray 1/2/17  FINDINGS: Small bowel dilation persists, consistent with ongoing small bowel  obstruction. There is little to no gas in the colon. There is an anastomotic  staple line in the right hemiabdomen. No pneumoperitoneum. The lung bases are  clear. A central line is partially imaged in the right atrium. IMPRESSION: Ongoing small bowel obstruction. CT Abd/Pelv CT 12/23/16  There is no pleural or pericardial effusion. There is no basilar pulmonary  nodule identified. There is no large hiatal hernia. Lung bases: Clear. Incidentally imaged heart and mediastinum: Unremarkable. Liver: No mass or biliary dilatation. Gallbladder: Unremarkable. Spleen: No mass. Pancreas: No mass or ductal dilatation. Adrenals: Unremarkable.    Kidneys: No mass, calculus, or hydronephrosis. Stomach: Unremarkable. Small bowel: No dilatation or wall thickening. Colon: No dilatation or wall thickening. Appendix: Surgically absent. Peritoneum: Increased complex ascites, similar to the prior study. Persistent  carcinomatosis. No pneumoperitoneum. Interval distention of small bowel loops  with decompressed loops of proximal large bowel. Retroperitoneum: No  lymphadenopathy or aortic aneurysm. Reproductive organs: Unchanged 10.5 x 8.8 cm right ovarian mass and 5.1 x 4.4   previously 4.1 x 3.3 cm left ovarian mass are unchanged. The uterus is   unremarkable. Urinary bladder: No mass or calculus. Bones: No destructive bone lesion. Additional comments: N/A   IMPRESSION:   Findings are consistent with early small bowel obstruction with transition point  in the pelvis. The ascending colon is decompressed. Bilateral ovarian masses  with peritoneal carcinomatosis and increased complex ascites    Recommendations/Plan:      Active Problems:    Colon cancer ([facility] [address] (1/1/2017)       ___________________________________________________  RECOMMENDATIONS:     51 y.o. F with metastatic colon adenoCA with peritoneal carcinomatosis and b/l ovarian mets with intermittent recurrent small bowel obstruction related to her tumor burden. This will not be amenable to operative or endoscopic management and will not be appropriate for venting gastrostomy or jejunal feeding as her ascites and carcinomatosis are contraindications to successful PEG or J-tube placement. Plan:  1) Consider for possible hospice/palliative care  2) Allow clears as clinically improved  3) Balance narcotic need with potential adverse narcotic effect. 4) PRN antiemetics  .   Discussed Code Status:    []    Full Code      [x]    DNR    ___________________________________________________  Care Plan discussed with:    [x]    Patient   [x]    Family   [x]    Nursing   [x]    AttendingFaby  Total Time :  48 minutes   ___________________________________________________  GI: Chio Stovall MD

## 2017-01-03 NOTE — PROGRESS NOTES
Oncology Nursing Communication Tool      7:58 AM  1/3/2017     Bedside and Verbal shift change report given to Vashti Meza RN (incoming nurse) by Brian Cervantes RN (outgoing nurse) on Florinda Route a 51 y.o. female who was admitted on 1/1/2017  4:54 PM. Report included the following information SBAR, Kardex, ED Summary, Procedure Summary, Intake/Output, MAR, Accordion, Recent Results and Med Rec Status. Significant changes during shift: several bowel movements overnight, some nausea - zofran helped. One PRN dose of Dilaudid given in addition to PCA      Issues for physician to address: patient wanst gastro consults opinon on current situation. Code Status: DNR     Infections: No current active infections     Allergies: Asparagus; Codeine; Other medication; and Percocet [oxycodone-acetaminophen]     Current diet: DIET NPO       Pain Controlled [] yes [] no   Bowel Movement [] yes [] no   Last Bowel Movement (date)     1/3/2017              Vital Signs:   Patient Vitals for the past 12 hrs:   Temp Pulse Resp BP SpO2   01/03/17 0611 97.8 °F (36.6 °C) 75 17 129/82 98 %   01/02/17 2038 98.6 °F (37 °C) 80 18 131/88 100 %      Intake & Output:     Intake/Output Summary (Last 24 hours) at 01/03/17 0758  Last data filed at 01/02/17 1609   Gross per 24 hour   Intake          1211.25 ml   Output                0 ml   Net          1211.25 ml      Laboratory Results:   No results found for this or any previous visit (from the past 12 hour(s)). Opportunity for questions and clarifications were given to the incoming nurse. Patient's bed is in low position, side rails x2, door open PRN, call bell within reach and patient not in distress.       Brian Cervantes RN

## 2017-01-03 NOTE — PROGRESS NOTES
Xanax ordered. Pt appears to be comfortable at the moment visiting with family and friends. No other needs, will continue to monitor.

## 2017-01-03 NOTE — PROGRESS NOTES
Mrs. Isaura Moreno is a 51year old female admitted on 2/1/63 dt complications r/t cancer. Pt is A/Ox4, VSS. Pt pain was not controlled well under current regimen however after discussion with patient and hospice RN patient PCA dose was adjusted, pt now appears to be more comfortable . Pt diet was advanced to CL, pt has increased their fluid intake and appetite is appropriate. Voiding without difficulty LBM: today. Pt being medicated for nausea ATC, no reports of falls. Pt is able to ambulate with 1X person assist. Pt is also able to reposition themselves in bed. At the moment pt appears to be comfortable, will continue to monitor and give report to oncoming RN.

## 2017-01-03 NOTE — HOSPICE
[address] [facility] Help to Those in Need  [phone]    Patient Name: Santosh Thomas  YOB: 1970    Date of Provider Hospice Visit: 01/03/17    Level of Care:   [x] General Inpatient (GIP)    [] Routine   [] Respite    Location of Care:  [] [facility] [place] [] [facility] [x] [facility] [] [facility] [] Hospice [facility]  [] Home [] Other:      Date of Hospice Admission: 1-1-17  Hospice Attending: Dr. Laurice Duane Diagnosis: Metastatic colon cancer  Diagnoses RELATED to the terminal prognosis: partial small bowel obstruction, b/l ovarian masses, peritoneal carcinomatosis  Other Diagnoses: abdominal ascites     HOSPICE NARRATIVE COMPOSED BY PHYSICIAN   Rationale for a prognosis of life expectancy of 6 months or less if the disease follows its normal course:  Santosh Thomas is a 51y.o. year old who was admitted to [facility]. The patient's principle diagnosis of metastatic colon cancer  has resulted in severe uncontrolled pain and nausea, PSBO with 2 hospitalizations for pain and PSBO last month and ED visit 2 days ago. Emesis x 2 today. Pt Functionally, the patient's Palliative Performance Scale has declined over a period of weeks and is estimated at 50. Objective information that support this patients limited prognosis includes: CT scan 12/23/16   IMPRESSION  IMPRESSION:   Findings are consistent with early small bowel obstruction with transition point  in the pelvis. The ascending colon is decompressed. Bilateral ovarian masses  with peritoneal carcinomatosis and increased complex ascites.        The patient/family chose comfort measures with the support of Hospice.     Attestation: I confirm that I composed this narrative as the physician and is based on my review of the patient's medical record and/or examination of the     Patient  rested well last night, only pushing PCA bolus once after midnight. Rates pain 1-2/10 now.   Thirsty want to drink something   Had 1 small emesis of mucous early last night--wants zofran scheduled  Had 1 small BM this morning    Had increased lower abd pain early this morning--pca bolusx4 then 1 mg iv dilaudid harris administered. Taking in sips water without difficulty  8 bm's per pt --now liquid  Pain is 2/10  Not seen yet by GI or oncologist      Portable  KUB yesterday--IMPRESSION  IMPRESSION: Ongoing small bowel obstruction. HOSPICE DIAGNOSES   Active Symptoms:  1. Severe lower abdominal pain  2. Nausea  3. anxiety  4. Distended abdomen  5. Hx PSBO     PLAN        1. Admitted GIP hospice for severe pain , nausea, abdominal distention not controlled with oxycontin, dilaudid and  zofran  2. Iv dilaudid 1mg now and then start PCA dilaudid -.4 mg/hr with 0.4 mg bolus q10 min prn  3. Iv zofran with dilaudid now and then prn  4. Taking sips of water  5. continue IVF  6. Schedule zofran q8 hrs for nasuea  7. Will ask Dr. Dalia Harris or associate to see in am . Pt/family reports that was to have f/u Dr. Dalia Harris next week. Discuss goals of care again. 8.   and SW to support family needs  9. Disposition: to home with hospice when sxs controlled        Abdominal pain now controlled on PCA dilaudid - mg basal with bolus prn, can adjust if needed later today or tomorrow  Continue IVF and NPO for now  Will get GI opinion re: PSBO and ability take in oral med,foods and liquids and if needs to have a venting gastrostomy. Would appreciate assistance from GI MD  Will ask oncology to see Pt or pt will need to see Dr Dalia Harris again re: possible further chemo as chemo is tentatively scheduled for tomorrow. Appreciate assistance from on call oncologist.  Pt will likely need to have PCA upon discharge to home--failed oral medicines several times now. Now that pt is more settled and out of pain crisis needs to make an informed decision re: any further chemo or intervention such as G -tube vs going home with hospice. Awaiting GI and oncology consultants.   Prognosis estimated based on 01/03/17 clinical assessment is:   [] Few to Many Hours  [] Few to Many Days    [x] Few to Many Weeks    [] Few to Many Months    Communicated plan of care with: Hospice Case Manager; Hospice IDT; Care Team     GOALS OF CARE     Resuscitation Status: DNR  Durable DNR: [] Yes [] No    Advance Care Planning 1/1/2017   Patient's Healthcare Decision Maker is: Verbal statement (Legal Next of Kin remains as decision maker)   Primary Decision Maker Name -   Primary Decision Maker Phone Number -   Primary Decision Maker Relationship to Patient -   Confirm Advance Directive Yes, not on file   Does the patient have other document types -        HISTORY     History obtained from: chart, patient and family    CHIEF COMPLAINT: pain and nausea and anxiety  The patient is:   [x] Verbal  [] Nonverbal  [] Unresponsive    HPI/SUBJECTIVE:      Went to Ed 2 days ago for pain and nausea. Sent home and feeling worse. Pain in abdomen in 9/10  emsis x 2 today. Unable to eat last 3 days.  Taking oxycontin 40mg bid and dilaudid 8 mg q4 hrs at home  Pt elects hospice care \" nothing further can be done\"  Pain rating 7/10 after dilaudid IV given       REVIEW OF SYSTEMS     The following systems were: [x] reviewed  [] unable to be reviewed    Positive ROS include:  Constitutional:  Ears/nose/mouth/throat:  Respiratory:  Gastrointestinal: pain and distenesion and nausea  Musculoskeletal:  Neurologic:anxious  Psychiatric:  Endocrine:     Adult Non-Verbal Pain Assessment Score: 7    Face  [] 0   No particular expression or smile  [] 1   Occasional grimace, tearing, frowning, wrinkled forehead  [x] 2   Frequent grimace, tearing, frowning, wrinkled forehead    Activity (movement)  [] 0   Lying quietly, normal position  [] 1   Seeking attention through movement or slow, cautious movement  [x] 2   Restless, excessive activity and/or withdrawal reflexes    Guarding  [] 0   Lying quietly, no positioning of hands over areas of body  [] 1 Splinting areas of the body, tense  [x] 2   Rigid, stiff    Physiology (vital signs)  [] 0   Stable vital signs  [x] 1   Change in any of the following: SBP > 20mm Hg; HR > 20/minute  [] 2   Change in any of the following: SBP > 30mm Hg; HR > 25/minute    Respiratory  [x] 0   Baseline RR/SpO2, compliant with ventilator  [] 1   RR > 10 above baseline, or 5% drop SpO2, mild asynchrony with ventilator  [] 2   RR > 20 above baseline, or 10% drop SpO2, asynchrony with ventilator     FUNCTIONAL ASSESSMENT     Palliative Performance Scale (PPS):50     PSYCHOSOCIAL/SPIRITUAL ASSESSMENT     Active Problems:    Colon cancer ([facility] [address] (2017)      Past Medical History   Diagnosis Date    Cancer [facility]) 2015     Colon 3C    [facility] ([facility]     Psychiatric disorder      Clinical Depression      Past Surgical History   Procedure Laterality Date    Hx  section      Hx appendectomy      Hx bunionectomy      Hx septoplasty      Hx colectomy       12 inches removed, no colostomy      Social History   Substance Use Topics    Smoking status: Never Smoker    Smokeless tobacco: Never Used    Alcohol use No      Comment: Denies at this time.       Family History   Problem Relation Age of Onset    Psychiatric Disorder Mother     Hypertension Mother     Arthritis-osteo Father     Diabetes Brother     Hypertension Brother     Headache Maternal Aunt       Allergies   Allergen Reactions    Asparagus Nausea and Vomiting    Codeine Photosensitivity    Other Medication Rash     oxaliplatin    Percocet [Oxycodone-Acetaminophen] Other (comments)     Hallucinations       Current Facility-Administered Medications   Medication Dose Route Frequency    ondansetron (ZOFRAN) injection 4 mg  4 mg IntraVENous Q8H    HYDROmorphone (PF) (DILAUDID) injection 1 mg  1 mg IntraVENous Q1H PRN    HYDROmorphone (PF) 15 mg/30 ml (DILAUDID) PCA   IntraVENous CONTINUOUS    0.9% sodium chloride infusion  75 mL/hr IntraVENous CONTINUOUS  ondansetron (ZOFRAN) injection 4 mg  4 mg IntraVENous Q4H PRN    bisacodyl (DULCOLAX) suppository 10 mg  10 mg Rectal DAILY PRN    dexamethasone (DECADRON) 4 mg/mL injection 2 mg  2 mg IntraVENous Q12H    LORazepam (ATIVAN) injection 0.5 mg  0.5 mg IntraVENous Q1H PRN    pantoprazole (PROTONIX) 40 mg in sodium chloride 0.9 % 10 mL injection  40 mg IntraVENous DAILY    sodium chloride (NS) flush 10 mL  10 mL InterCATHeter PRN    prochlorperazine (COMPAZINE) with saline injection 5 mg  5 mg IntraVENous Q6H PRN        PHYSICAL EXAM     Wt Readings from Last 3 Encounters:   12/30/16 135 lb (61.2 kg)   12/23/16 131 lb 6.3 oz (59.6 kg)   12/04/16 135 lb (61.2 kg)       Visit Vitals    /82 (BP 1 Location: Left arm, BP Patient Position: At rest)    Pulse 75    Temp 97.8 °F (36.6 °C)    Resp 17    SpO2 98%       Supplemental O2  [] Yes  [x] NO  Last bowel movement:     Currently this patient has:  [] Peripheral IV [] PICC  [x] PORT [] ICD    [] Amin Catheter [] NG Tube   [] PEG Tube    [] Rectal Tube [] Drain  [] Other:     Constitutional: awake, alert and appears comfortable now  Eyes: perrl  ENMT: clear rey  Cardiovascular: rrr  Respiratory: clear  Gastrointestinal: distended upper abdomen, firm  Musculoskeletal:ankle edema b/l  Skin: warm, dry  Neurologic: a x o x 3  Psychiatric:   Other:    Pertinent Lab and or Imaging Tests:  Lab Results   Component Value Date/Time    Sodium 136 12/30/2016 07:30 PM    Potassium 3.7 12/30/2016 07:30 PM    Chloride 97 12/30/2016 07:30 PM    CO2 28 12/30/2016 07:30 PM    Anion gap 11 12/30/2016 07:30 PM    Glucose 134 12/30/2016 07:30 PM    BUN 8 12/30/2016 07:30 PM    Creatinine 0.74 12/30/2016 07:30 PM    BUN/Creatinine ratio 11 12/30/2016 07:30 PM    GFR est AA >60 12/30/2016 07:30 PM    GFR est non-AA >60 12/30/2016 07:30 PM    Calcium 9.4 12/30/2016 07:30 PM     Lab Results   Component Value Date/Time    Protein, total 8.5 12/30/2016 07:30 PM    Albumin 3.6 12/30/2016 07:30 PM           Total time: 30 min  Counseling / coordination time: d/w palliative MD hospice RN, floor RN, pt, family and on call oncologist  > 50% counseling / coordination?: yes

## 2017-01-03 NOTE — PROGRESS NOTES
Hematology Oncology Progress Note         Follow up for: metastatic colon cancer    Chart notes reviewed since last visit. Case discussed with following: patient, nursing staff, hospice staff. Patient complains of the following: having frequent small volume loose BMs. Pain under much improved control. She looks brighter. Reports being very tired - 18 month ordeal thus far - seems very much disinclined to put her \"elderly parent through this\" for much longer. Wants specifics to the best extent possible. Additional concerns noted by the staff:     Patient Vitals for the past 24 hrs:   BP Temp Pulse Resp SpO2   01/03/17 0611 129/82 97.8 °F (36.6 °C) 75 17 98 %   01/02/17 2038 131/88 98.6 °F (37 °C) 80 18 100 %   01/02/17 1345 120/88 97.8 °F (36.6 °C) 84 16 97 %       Review of Systems:  Constitutional  fatigue  weight loss. Allergic/Immunologic No recent allergic reactions   Eyes No significant visual difficulties. No diplopia. ENMT No problems with hearing, no sore throat, no sinus drainage. Endocrine No hot flashes or night sweats. No cold intolerance, polyuria, or polydipsia   Hematologic/Lymphatic No easy bruising or bleeding. The patient denies any tender or palpable lymph nodes   Breasts No abnormal masses of breast, nipple discharge or pain. Respiratory No dyspnea on exertion, orthopnea, chest pain, cough or hemoptysis. Cardiovascular No anginal chest pain, irregular heart beat, tachycardia, palpitations or orthopnea. Gastrointestinal intermittent nausea, vomiting, diarrhea, constipation, cramping, reflux, heartburn   Genitourinary (M) No hematuria, dysuria, increased frequency, urgency, hesitancy or incontinence. Musculoskeletal No joint pain, swelling or redness. No decreased range of motion. Integumentary No chronic rashes, inflammation, ulcerations, pruritus, petechiae, purpura, ecchymoses, or skin changes.    Neurologic No headache, blurred vision, and no areas of focal weakness or numbness. Normal gait. No sensory problems. Psychiatric No insomnia, depression, yosef or mood swings. No psychotropic drugs. Physical Examination:  Constitutional Alert, cooperative, oriented. Mood and affect appropriate. Appears close to chronological age. thin   Head Normocephalic; no scars   Eyes Conjunctivae and sclerae are clear and without icterus. Pupils are reactive and equal.   ENMT Sinuses are nontender. No oral exudates, ulcers, masses, thrush or mucositis. Oropharynx clear. Tongue normal.   Neck Supple without masses or thyromegaly. No jugular venous distension. Hematologic/Lymphatic No petechiae or purpura. No tender or palpable lymph nodes noted   Respiratory Lungs are clear to auscultation without rhonchi or wheezing. Cardiovascular Regular rate and rhythm of heart without murmurs, gallops or rubs. Chest / Line Site Chest is symmetric with no chest wall deformities. Abdomen Non-tender, non-distended, no masses, ascites or hepatosplenomegaly. Good bowel sounds. No guarding or rebound tenderness. No pulsatile masses. Musculoskeletal No tenderness or swelling, normal range of motion without obvious weakness. Extremities No visible deformities, no cyanosis, clubbing or edema. Skin No rashes, scars, or lesions suggestive of malignancy. No petechiae, purpura, or ecchymoses. No excoriations. Neurologic No sensory or motor deficits noted   Psychiatric Alert and oriented. Coherent speech. Verbalizes understanding of our discussions today. Labs:  Recent Results (from the past 24 hour(s))   GLUCOSE, POC    Collection Time: 01/02/17  1:57 PM   Result Value Ref Range    Glucose (POC) 97 65 - 100 mg/dL    Performed by Abraham Orlando        Imaging:  CT 12/23/16 IMPRESSION:   Findings are consistent with early small bowel obstruction with transition point  in the pelvis. The ascending colon is decompressed.  Bilateral ovarian masses  with peritoneal carcinomatosis and increased complex ascites.       Assessment and Plan:   Metastatic colon cancer with peritoneal carcinomatosis and ovarian mets - she seems to have decided to pursue a comfort care approach based on the tone of conversation today and the specific questions she has. Nothing I have offers a chance of cure and she is not interested in delaying the inevitable if it means putting her parents and son through more uncertainty and emotional distress. She has already been working on putting her affairs in order and believes a chemo-free time period offers her the best quality of life for the time she has remaining.

## 2017-01-04 NOTE — HOSPICE
[employer] RN note:  Pt found covered in vomit. Discussed with Dr Bel Deleon, Dr Jaelyn Thompson and staff RN Christine Hooper. Plan at this time is to wait for pt to become more alert to discuss options going forward. T/C to [facility] to hold PCA delivery until further notice, pt will not be going home today. Home team notified. T/C to pt's mother to update her on pt status and inform her that pt will not be going home today. 5:30----multiple visits throughout day for symptom management and intense emotional support to family. Pt continues to vomit with increasingly less volume. Pt declining rapidly, episodes of intense pain and agitation relieved with dilaudid boluses (8 PCA boluses and 4 RN given 1mg) and IV ativan (4  X 0.5mg and 1 x 1mg)  Will discuss increasing PCA basal rate to 0.8mg and scheduled ativan at 0.5mg IV every 4 hrs with PRN availability. [place] Help to Those in Need  [phone]    GIP Daily Nursing Note   Patient Name: Carolina Duarte  YOB: 1970  Age: 51 y.o. Date of Visit: 01/04/17  [facility] of [id number] [facility]  Patient Room: 1112/     Hospice Attending: Arsalan Hampton MD  Hospice Diagnosis: Metastatic colon cancer  Colon cancer [facility])  Colon cancer ([facility] [address]    Level of Care: GIP  GIP Symptoms: vomiting, pain, agitation    Current GIP Symptoms    See above        ASSESSMENT & PLAN     1. Vomiting:  Multiple episodes of vomiting due to bowel obstruction despite scheduled Zofran IV. Discussed placement of NG tube. This has been traumatic to pt in the past per family and decision made not to attempt placement  2. Pain: intense 10/10 abdominal pain due to bowel obstruction. PCA basal rate increased to 0.8mg/hr with 0.5mg bolus every 10min available as well as an RN provided 1mg PRN for intractable pain beyond PCA coverage. 3.  Agitation:  Episodes of severe agitation.   Newly scheduled ativan 0.5mg IV every 4 hrs with 1mg IV available PRN.      Spiritual Interventions: mother states, \"I wish she had a belief in God. \"  Mother has a deep catrachito and is surrendered to His will. Psych/ Social/ Emotional Interventions: 11yo son present in the afternoon after school. He is aware that pt will probably not make it home. They used to hike together and play board games    Care Coordination Needs: Dr Sandra Bob (GI) hospice team in IDT meeting this morning. Care plan and New Orders discussed / approved with Kait Slater MD.    Description History and Chart Review   If this is initial GIP note must document RN assessment/MD communication in previous setting. Specifically document nursing/medication needs in last 24 hours to support GIP care  Narrative History of last 24 hours that demonstrates care cannot be provided in another setting:  See above    What has been done to control the patient's symptoms in the last 24 hours? See above    Does the patient currently require IV medications? yes  Does the patient currently require scheduled medications? yes  Does the patient currently require a PCA?  yes    List number of doses of PRN medications in last 24 hours:  See above  Medication 1:  Number of doses:    Medication 2:   Number of doses:    Medication 3:   Number of doses:    Supporting documentation for GIP need for pain control:  [x] Frequent evaluation by a doctor, nurse practitioner, nurse   [x] Frequent medication adjustment    [x] IVs that cannot be administered at home   [x] Aggressive pain management   [x] Complicated technical delivery of medications              Supporting documentation for GIP need for symptom control:  [x]  Sudden decline necessitating intensive nursing intervention  [x]  Uncontrolled / intractable nausea or vomiting   []  Pathological fractures  []  Advanced open wounds requiring frequent skilled care  [] Unmanageable respiratory distress  [x] New or worsening delirium   [] Delirium with behavior issues: Is 24 hour caregiver present due to safety concerns with agitation? (yes/no)  [x] Imminent death  with skilled nursing needs documented above    DISCHARGE PLANNING   Daily discharge planning required for GIP  1. Discharge Plan: home with hospice should pt stabilize  2. Patient/Family teaching: end of life teaching  3. Response to patient/family teaching: expressed understanding and appreciation    ASSESSMENT    KARNOFSKY: 10    Prognosis estimated based on 01/04/17 clinical assessment is:   [x] Few to Many Hours  [] Hours to Days   [] Few to Many Days   [] Days to Weeks   [] Few to Many Weeks   [] Weeks to Months   [] Few to Many Months    Quality Measure: Patient self-reports:  [] Yes    [x] No    ESAS:   Time of Assessment: 9:00am  Pain (1-10):10 through night, labile through day  Fatigue (1-10):   Shortness of breath (1-10):  Nausea (1-10): 10  Appetite (1-10):    Anxiety: (1-10):   Depression: (1-10):   Well-being: (1-10):   Constipation: _ Yes  _ No  LAST BM:     CLINICAL INFORMATION   Patient Vitals for the past 12 hrs:   Temp Pulse Resp BP SpO2   01/04/17 0557 97.4 °F (36.3 °C) (!) 108 18 114/77 95 %       Currently this patient has:  [] Supplemental O2   [] IV    [] PICC      [x] PORT   [] NG Tube    [] PEG Tube   [] Ostomy     [] Amin draining _______ urine  [] Other:     SIGNS/PHYSICAL FINDINGS     Skin (including wound):  [] Warm, dry, supple, intact and color normal for race  [x] Warm   [] Dry   [] Cool     [] Clammy       [] Diaphoretic    Turgor   [] Normal   [x] Decreased  Color:   [] Pink   [] Pale   [] Cyanotic   [] Erythema   [] Jaundice   [] Normal for Race  []  Wounds:    Neuro:  [] Lethargy  [] Restlessness / agitation  [] Confusion / delirium  [] Hallucinations  [] Responds to maximal stimulation  [] Unresponsive  [] Seizures     Cardiac:  [] Dyspnea on Exertion  [] JVD  [] Murmur  [] Palpitations  [] Hypotension  [] Hypertension  [] Tachycardia  [] Bradycardia  [] Irregular HR  [] Pulses Decreased  [] Pulses Absent  [] Edema:       (Location, Grade and Pitting)  [] Mottling:      (Location)    Respiratory:  Breath sounds:    [] Diminished   [] Wheeze   [] Rhonchi   [] Rales   [] Even and unlabored  [] Labored:            [] Cough   [] Non Productive   [] Productive    [] Description:           [] Deep suctioned   [] O2 at ___ LPM  [] High flow oxygen greater than 10 LPM  [] Bi-Pap    GI  [] Abdomen (describe)   [] Ascites  [x] Nausea  [x] Vomiting  [x] Incontinent of bowels  [] Bowel sounds (yes/no)typanic  [x] Diarrhea  [] Constipation (see above including last bowel movement)  [] Checked for impaction  [] Last BM     Nutrition  Diet:_NPO_________  Appetite:   [] Good   [] Fair   [] Poor   [] Tube Feeding       [] Voiding  [x] Incontinent   [] Amin    Musculoskeletal  [] Balance/[place] Unsteady   [x] Weak   Strength:    [] Normal    [] Limited    [] Decreasing   Activities:    [] Up as tolerated   [x] Bedridden    [] Specify:    SAFETY  [x] 24 hr. Caregiver   [x] Side rails ? [x] Hospital bed   [] Reviewed Falls & Safety       ALLERGIES AND MEDICATIONS     Allergies:    Allergies   Allergen Reactions    Asparagus Nausea and Vomiting    Codeine Photosensitivity    Other Medication Rash     oxaliplatin    Percocet [Oxycodone-Acetaminophen] Other (comments)     Hallucinations        Current Facility-Administered Medications   Medication Dose Route Frequency    ondansetron (ZOFRAN) injection 8 mg  8 mg IntraVENous Q8H    prochlorperazine (COMPAZINE) with saline injection 10 mg  10 mg IntraVENous Q6H PRN    promethazine (PHENERGAN) 12.5 mg in 0.9% sodium chloride 50 mL IVPB  12.5 mg IntraVENous Q6H PRN    LORazepam (ATIVAN) injection 1 mg  1 mg IntraVENous Q1H PRN    HYDROmorphone (PF) (DILAUDID) injection 1 mg  1 mg IntraVENous Q1H PRN    HYDROmorphone (PF) 15 mg/30 ml (DILAUDID) PCA   IntraVENous CONTINUOUS    ondansetron (ZOFRAN) injection 4 mg  4 mg IntraVENous Q4H PRN    bisacodyl (DULCOLAX) suppository 10 mg  10 mg Rectal DAILY PRN    LORazepam (ATIVAN) injection 0.5 mg  0.5 mg IntraVENous Q1H PRN    sodium chloride (NS) flush 10 mL  10 mL InterCATHeter PRN          Visit Time In: multiple visits through day  Visit Time Out: 5:00pm        Thank you for providing such great care to this  pt and family. Please contact hospice at [phone] with any questions or concerns.

## 2017-01-04 NOTE — PROGRESS NOTES
Oncology Nursing Communication Tool      8:01 AM  1/4/2017     Bedside and Verbal shift change report given to [facility], and Yenifer Hill RN (incoming nurse) by Henri Martinez RN (outgoing nurse) on [facility]ie a 51 y.o. female who was admitted on 1/1/2017  4:54 PM. Report included the following information SBAR, Kardex, ED Summary, Procedure Summary, Intake/Output, MAR, Accordion, Recent Results and Med Rec Status. Significant changes during shift: lots of pain, and restlessness, placed on bed alarm due to increased weakness ambulating to the bathroom- see notes of night overview      Issues for physician to address: see notes            Code Status: DNR     Infections: No current active infections     Allergies: Asparagus; Codeine; Other medication; and Percocet [oxycodone-acetaminophen]     Current diet: DIET CLEAR LIQUID       Pain Controlled [] yes [] no   Bowel Movement [] yes [] no   Last Bowel Movement (date)     1/3/2017            Vital Signs:   Patient Vitals for the past 12 hrs:   Temp Pulse Resp BP SpO2   01/04/17 0557 97.4 °F (36.3 °C) (!) 108 18 114/77 95 %      Intake & Output:   No intake or output data in the 24 hours ending 01/04/17 0801   Laboratory Results:   No results found for this or any previous visit (from the past 12 hour(s)). Opportunity for questions and clarifications were given to the incoming nurse. Patient's bed is in low position, side rails x2, door open PRN, call bell within reach and patient not in distress.       Henri Martinez RN

## 2017-01-04 NOTE — PROGRESS NOTES
8:26 AM pt cleansed of large amount of vomit. Dr. Alberto Gonsales and Ashley Leigh, Hospice RN aware.

## 2017-01-04 NOTE — HOSPICE
This MSW met with pt, parents Deidre Gimenez and family friend Robert Allred. Parents have bought a house to care for pt and for pts' 15 yr old son, to raise once pt is dead. Pt has done excellent end of life planning and memorabelia for son. Parents have kinship rights to son, as father was  Sperm donor and has no parental right. sParents are meeitng with Greg's on [employer] on Friday for pt cremation plans. Son is starting a new school this week, due to moving in with grandparents in a new school district. Pt has a brother Makenzie Guerra who is local and supportive of her and her parents. Pt is very pragmatic about terminal status but untrusting of medical professionals due to lack of being able to get her pain under control. Pt is unwiling to seriously consider discharge to home until she feels that her symtpoms can be managed, but did share that she would like to die at home if possible. Pt is member of [employer] of [facility] and is meeting with her  to further discuss her [place]. Pt has [employer] managing her Medicaid.    SW frequency: 1 x week x 2,  4 prns

## 2017-01-04 NOTE — PROGRESS NOTES
Hematology Oncology Progress Note         Follow up for: metastatic colon cancer    Chart notes reviewed since last visit. Case discussed with following: nursing staff    Patient complains of the following: feculent emesis right before I entered room so it was on pillow and upper gown when I entered room but she was very groggy and unable to converse. Additional concerns noted by the staff: NG? Patient Vitals for the past 24 hrs:   BP Temp Pulse Resp SpO2   01/04/17 0557 114/77 97.4 °F (36.3 °C) (!) 108 18 95 %   01/03/17 1415 120/64 97.6 °F (36.4 °C) 70 16 96 %       Review of Systems:  Constitutional  fatigue  weight loss. Allergic/Immunologic No recent allergic reactions   Eyes No significant visual difficulties. No diplopia. ENMT No problems with hearing, no sore throat, no sinus drainage. Endocrine No hot flashes or night sweats. No cold intolerance, polyuria, or polydipsia   Hematologic/Lymphatic No easy bruising or bleeding. The patient denies any tender or palpable lymph nodes   Breasts No abnormal masses of breast, nipple discharge or pain. Respiratory No dyspnea on exertion, orthopnea, chest pain, cough or hemoptysis. Cardiovascular No anginal chest pain, irregular heart beat, tachycardia, palpitations or orthopnea. Gastrointestinal intermittent nausea, vomiting, diarrhea, constipation, cramping, reflux, heartburn   Genitourinary (M) No hematuria, dysuria, increased frequency, urgency, hesitancy or incontinence. Musculoskeletal No joint pain, swelling or redness. No decreased range of motion. Integumentary No chronic rashes, inflammation, ulcerations, pruritus, petechiae, purpura, ecchymoses, or skin changes. Neurologic No headache, blurred vision, and no areas of focal weakness or numbness. Normal gait. No sensory problems. Psychiatric No insomnia, depression, yosef or mood swings. No psychotropic drugs. Physical Examination:  Constitutional Very groggy.  Mood and affect appropriate. Appears close to chronological age. thin   Head Normocephalic; no scars   Eyes Conjunctivae and sclerae are clear and without icterus. Pupils are reactive and equal.   ENMT Sinuses are nontender. No oral exudates, ulcers, masses, thrush or mucositis. Oropharynx clear. Tongue normal.   Neck Supple without masses or thyromegaly. No jugular venous distension. Hematologic/Lymphatic No petechiae or purpura. No tender or palpable lymph nodes noted   Respiratory Lungs are clear to auscultation without rhonchi or wheezing. Cardiovascular Regular rate and rhythm of heart without murmurs, gallops or rubs. Chest / Line Site Chest is symmetric with no chest wall deformities. Abdomen Non-tender, non-distended, no masses, ascites or hepatosplenomegaly. Good bowel sounds. No guarding or rebound tenderness. No pulsatile masses. Musculoskeletal No tenderness or swelling, normal range of motion without obvious weakness. Extremities No visible deformities, no cyanosis, clubbing or edema. Skin No rashes, scars, or lesions suggestive of malignancy. No petechiae, purpura, or ecchymoses. No excoriations. Neurologic No sensory or motor deficits noted   psych lethargic       Labs:  No results found for this or any previous visit (from the past 24 hour(s)). Imaging:  CT 12/23/16 IMPRESSION:   Findings are consistent with early small bowel obstruction with transition point  in the pelvis. The ascending colon is decompressed. Bilateral ovarian masses  with peritoneal carcinomatosis and increased complex ascites.       Assessment and Plan:   Metastatic colon cancer with peritoneal carcinomatosis and ovarian mets - she has progressed to bowel blockage at this point with repeated episodes of emesis of feculent material. She is not candidate for venting gastrostomy tube and previously did not want an NG.  Will try to maximize comfort measures in house and see if later, when she awakes more, if she would want an NG. She has not had this kind of emesis before to my knowledge. Continue PCA. Oral medications not appropriate at present. Called mother and updated her as to change in status.

## 2017-01-04 NOTE — HOSPICE
[facility] LCSW Note: Patient has had several periods of pain issues and N/V through the early part of the day. Met with Dr. Frantz Salazar and patient's parents to discuss goal of comfort and keeping patient in the hospital to manage her symptoms. She is not able to go home with current symptom issues. Parents told that patient's time is very limited and that if they want to bring her son to the hospital, this would probably be the time to do that. Parents plan to pick him up from school and bring him in. Patient's father grieving openly, quite tearful and processing anticipated loss. Patient's brother also plans to come in to see patient today. Discussed with grandparents how to prepare 14 yo Tony Burgosyers about patient's condition and that she won't be able to respond back, but that he can talk to patient. Support provided to parents. Mother shared that she wasn't happy about patient joining the [employer] here and was hoping she would \"return to the [place]rachito we raised her in but she doesn't have time now\". Validated her feelings and offered hospice chaplain to process this. Informed hospice chaplain about the above. She did visit family today. Dr. Frantz Salazar also spoke to parents about discontinuing IV fluids. Emotional support will continue to be provided to family as needed. Informed parents about bereavement support for patient's son, Tony Allison. Will be available to Tony Allison as needed, when he does come in. Met with Jackie Sofia, his daughter and patient's parents again. Tony Allison spent a brief time in patient room and is in the other room of the hospice suite. When asked what he would like for his mother, he noted \"to survive\". Validated his feelings and shared that she likely won't survive this. He asked \"how many days does she have\"? I did share with him that she has days vs weeks but no one knows for sure.  Assured him that patient knows he is present and could hear him even though she cannot answer him back right now. Patient had texted son yesterday and we talked about this quick change in her today. Patient's breathing has changed, she looks like she is nearing the end. Shared with Lesa Rhodes that we are here if needed. He will likely use family for support.

## 2017-01-04 NOTE — PROGRESS NOTES
2305 Patient having severe left side flank pain that radiates to the abdomen. 2 PRN doses of dilaudid given as per mar. On call nurse for hospice paged. 2315 Spoke to Ta with hospice. Okay to give, another PRN dilaudid dose now. Remind patien to push button, if not better after a few pca doses and prn doses, okay to call back. Will continue to monitor    2322 Patient received another 1mg dilaudid per MAr, and one dose ativan    0120 Patient received additional dose of dilaudid and ativan for severe pain of 10/10, encouraged patient to use PCA pump    0230 Patient ambulated to bathroom, but became very weak on way back to bed. Bedside commode placed at bedside, and bed alarm placed on patients bed. Patient instructed to call for assistance. 0320 Ambulated to bedside potty multiple times, after bed alarm going off with patient sitting on side of bed. Patient asked if she would prefer a brief, she stated \"yes\". 0330 Patient pulled off brief, and has been very restless  0400 Continue to monitor patient    0500 Port appeared to be leaking, upon inspection needle was bent. Port needle replaced.    0100 Ativan given - patient very restless all over bed.   0600 Patient resting comfortably  0710 Patient awake at bedside report attempting to get out of bed for bedside comode, this writer and oncoming nurse assisted patient to bedside comode

## 2017-01-04 NOTE — PROGRESS NOTES
1043: Pt very agitated, up on all fours in bed, taking clothes off, pulling at lines;0.5 mg of ativan given for agitation

## 2017-01-04 NOTE — HOSPICE
[facility]  [phone]    Patient Name: Cassandra Conner  YOB: 1970    Date of Provider Hospice Visit: 01/04/17    Level of Care:   [x] General Inpatient (GIP)    [] Routine   [] Respite    Location of Care:  [] [facility] [] [facility] [x] [facility] [] [facility] [] [facility]  [] Home [] Other:      Date of Hospice Admission: 1-1-17  Hospice Attending: Dr. Nery Pascal Diagnosis: Metastatic colon cancer  Diagnoses RELATED to the terminal prognosis: partial small bowel obstruction, b/l ovarian masses, peritoneal carcinomatosis  Other Diagnoses: abdominal ascites     HOSPICE NARRATIVE COMPOSED BY PHYSICIAN   Rationale for a prognosis of life expectancy of 6 months or less if the disease follows its normal course:  Cassandra Conner is a 51y.o. year old who was admitted to [facility]. The patient's principle diagnosis of metastatic colon cancer  has resulted in severe uncontrolled pain and nausea, PSBO with 2 hospitalizations for pain and PSBO last month and ED visit 2 days ago. Emesis x 2 today. Pt Functionally, the patient's Palliative Performance Scale has declined over a period of weeks and is estimated at 50. Objective information that support this patients limited prognosis includes: CT scan 12/23/16   IMPRESSION  IMPRESSION:   Findings are consistent with early small bowel obstruction with transition point  in the pelvis. The ascending colon is decompressed. Bilateral ovarian masses  with peritoneal carcinomatosis and increased complex ascites.        The patient/family chose comfort measures with the support of Hospice.     Attestation: I confirm that I composed this narrative as the physician and is based on my review of the patient's medical record and/or examination of the     Patient  rested well last night, only pushing PCA bolus once after midnight. Rates pain 1-2/10 now.   Thirsty want to drink something   Had 1 small emesis of mucous early last night--wants zofran scheduled  Had 1 small BM this morning    Had increased lower abd pain early this morning--pca bolusx4 then 1 mg iv dilaudid harris administered. Taking in sips water without difficulty  8 bm's per pt --now liquid  Pain is 2/10  Not seen yet by GI or oncologist      Portable  KUB yesterday--IMPRESSION  IMPRESSION: Ongoing small bowel obstruction. HOSPICE DIAGNOSES   Active Symptoms:  1. Severe lower abdominal pain  2. Nausea  3. anxiety  4. Distended abdomen  5. Hx PSBO     PLAN        1. Admitted GIP hospice for severe pain , nausea, abdominal distention not controlled with oxycontin, dilaudid and  zofran  2. Appreciate help from GI and oncology MD's! 3. Pt is comfort care--d/w pt's mother today. D/C IVF. Prognosis hours to days-discussed  4. Pt's mother may bring the son in later today. 5. May need to titrate up dilaudid PCA rate-will d/w RN  6. Consider NGtube if has recurrent emesis  7.  and SW to support family needs  8. Disposition: to home with hospice when sxs controlled        Abdominal pain now controlled on PCA dilaudid - mg basal with bolus prn, can adjust if needed later today or tomorrow  Continue IVF and NPO for now  Will get GI opinion re: PSBO and ability take in oral med,foods and liquids and if needs to have a venting gastrostomy. Would appreciate assistance from GI MD  Will ask oncology to see Pt or pt will need to see Dr Narayan Jaeger again re: possible further chemo as chemo is tentatively scheduled for tomorrow. Appreciate assistance from on call oncologist.  Pt will likely need to have PCA upon discharge to home--failed oral medicines several times now. Now that pt is more settled and out of pain crisis needs to make an informed decision re: any further chemo or intervention such as G -tube vs going home with hospice.       Fecal emesis this morning  Increased pain and agitation requiring dilaudid 4mg over 5 hrs last night and ativan this morning  Pt is unresponsive now    Awaiting GI and oncology consultants. Prognosis estimated based on 01/04/17 clinical assessment is:   [] Few to Many Hours  [x] Few to Many Days    [] Few to Many Weeks    [] Few to Many Months    Communicated plan of care with: Hospice Case Manager; Hospice IDT; Care Team     GOALS OF CARE     Resuscitation Status: DNR  Durable DNR: [] Yes [] No    Advance Care Planning 1/1/2017   Patient's Healthcare Decision Maker is: Verbal statement (Legal Next of Kin remains as decision maker)   Primary Decision Maker Name -   Primary Decision Maker Phone Number -   Primary Decision Maker Relationship to Patient -   Confirm Advance Directive Yes, not on file   Does the patient have other document types -        HISTORY     History obtained from: chart, patient and family    CHIEF COMPLAINT: pain and nausea and anxiety  The patient is:   [x] Verbal  [] Nonverbal  [] Unresponsive    HPI/SUBJECTIVE:      Went to Ed 2 days ago for pain and nausea. Sent home and feeling worse. Pain in abdomen in 9/10  emsis x 2 today. Unable to eat last 3 days.  Taking oxycontin 40mg bid and dilaudid 8 mg q4 hrs at home  Pt elects hospice care \" nothing further can be done\"  Pain rating 7/10 after dilaudid IV given       REVIEW OF SYSTEMS     The following systems were: [x] reviewed  [] unable to be reviewed    Positive ROS include:  Constitutional:  Ears/nose/mouth/throat:  Respiratory:  Gastrointestinal: pain and distenesion and nausea  Musculoskeletal:  Neurologic:anxious  Psychiatric:  Endocrine:     Adult Non-Verbal Pain Assessment Score: 7    Face  [] 0   No particular expression or smile  [] 1   Occasional grimace, tearing, frowning, wrinkled forehead  [x] 2   Frequent grimace, tearing, frowning, wrinkled forehead    Activity (movement)  [] 0   Lying quietly, normal position  [] 1   Seeking attention through movement or slow, cautious movement  [x] 2   Restless, excessive activity and/or withdrawal reflexes    Guarding  [] 0   Lying quietly, no positioning of hands over areas of body  [] 1   Splinting areas of the body, tense  [x] 2   Rigid, stiff    Physiology (vital signs)  [] 0   Stable vital signs  [x] 1   Change in any of the following: SBP > 20mm Hg; HR > 20/minute  [] 2   Change in any of the following: SBP > 30mm Hg; HR > 25/minute    Respiratory  [x] 0   Baseline RR/SpO2, compliant with ventilator  [] 1   RR > 10 above baseline, or 5% drop SpO2, mild asynchrony with ventilator  [] 2   RR > 20 above baseline, or 10% drop SpO2, asynchrony with ventilator     FUNCTIONAL ASSESSMENT     Palliative Performance Scale (PPS):50     PSYCHOSOCIAL/SPIRITUAL ASSESSMENT     Active Problems:    [facility]      Past Medical History   Diagnosis Date    Cancer [facility])      Colon 3C    Ovarian cancer ([facility]     Psychiatric disorder      Clinical Depression      Past Surgical History   Procedure Laterality Date    Hx  section      Hx appendectomy      Hx bunionectomy      Hx septoplasty      Hx colectomy       12 inches removed, no colostomy      Social History   Substance Use Topics    Smoking status: Never Smoker    Smokeless tobacco: Never Used    Alcohol use No      Comment: Denies at this time.       Family History   Problem Relation Age of Onset    Psychiatric Disorder Mother     Hypertension Mother     Arthritis-osteo Father     Diabetes Brother     Hypertension Brother     Headache Maternal Aunt       Allergies   Allergen Reactions    Asparagus Nausea and Vomiting    Codeine Photosensitivity    Other Medication Rash     oxaliplatin    Percocet [Oxycodone-Acetaminophen] Other (comments)     Hallucinations       Current Facility-Administered Medications   Medication Dose Route Frequency    ondansetron (ZOFRAN) injection 8 mg  8 mg IntraVENous Q8H    prochlorperazine (COMPAZINE) with saline injection 10 mg  10 mg IntraVENous Q6H PRN    promethazine (PHENERGAN) 12.5 mg in 0.9% sodium chloride 50 mL IVPB  12.5 mg IntraVENous Q6H PRN    LORazepam (ATIVAN) injection 1 mg  1 mg IntraVENous Q1H PRN    HYDROmorphone (PF) (DILAUDID) injection 1 mg  1 mg IntraVENous Q1H PRN    HYDROmorphone (PF) 15 mg/30 ml (DILAUDID) PCA   IntraVENous CONTINUOUS    ondansetron (ZOFRAN) injection 4 mg  4 mg IntraVENous Q4H PRN    bisacodyl (DULCOLAX) suppository 10 mg  10 mg Rectal DAILY PRN    LORazepam (ATIVAN) injection 0.5 mg  0.5 mg IntraVENous Q1H PRN    sodium chloride (NS) flush 10 mL  10 mL InterCATHeter PRN        PHYSICAL EXAM     Wt Readings from Last 3 Encounters:   12/30/16 135 lb (61.2 kg)   12/23/16 131 lb 6.3 oz (59.6 kg)   12/04/16 135 lb (61.2 kg)       Visit Vitals    /77 (BP 1 Location: Left arm)    Pulse (!) 108    Temp 97.4 °F (36.3 °C)    Resp 18    SpO2 95%       Supplemental O2  [] Yes  [x] NO  Last bowel movement:     Currently this patient has:  [] Peripheral IV [] PICC  [x] PORT [] ICD    [] Amin Catheter [] NG Tube   [] PEG Tube    [] Rectal Tube [] Drain  [] Other:     Constitutional: awake, alert and appears comfortable now  Eyes: perrl  ENMT: clear rey  Cardiovascular: rrr  Respiratory: clear  Gastrointestinal: distended upper abdomen, firm  Musculoskeletal:ankle edema b/l  Skin: warm, dry  Neurologic: a x o x 3  Psychiatric:   Other:    Pertinent Lab and or Imaging Tests:  Lab Results   Component Value Date/Time    Sodium 136 12/30/2016 07:30 PM    Potassium 3.7 12/30/2016 07:30 PM    Chloride 97 12/30/2016 07:30 PM    CO2 28 12/30/2016 07:30 PM    Anion gap 11 12/30/2016 07:30 PM    Glucose 134 12/30/2016 07:30 PM    BUN 8 12/30/2016 07:30 PM    Creatinine 0.74 12/30/2016 07:30 PM    BUN/Creatinine ratio 11 12/30/2016 07:30 PM    GFR est AA >60 12/30/2016 07:30 PM    GFR est non-AA >60 12/30/2016 07:30 PM    Calcium 9.4 12/30/2016 07:30 PM     Lab Results   Component Value Date/Time    Protein, total 8.5 12/30/2016 07:30 PM    Albumin 3.6 12/30/2016 07:30 PM           Total time: 30 min  Counseling / coordination time: d/w hospice, floor RN.  Hospice SW and pt's mother today  > 50% counseling / coordination?: yes

## 2017-01-05 PROCEDURE — 0656 HSPC GENERAL INPATIENT

## 2017-01-05 NOTE — PROGRESS NOTES
Spiritual Care Assessment/Progress Notes    Na Estrella [id number]  [id number]    1970  51 y.o.  female    Patient Telephone Number: [phone] (home)   Christianity Affiliation: Kyle Rossi   Language: English   Extended Emergency Contact Information  Primary Emergency Contact: [address]  Mobile Phone: [phone]  Relation: Mother   Patient Active Problem List    Diagnosis Date Noted    [facility] ([email or web address] 01/01/2017    Nausea 12/24/2016    Anxiety 12/24/2016    Depression 12/24/2016    SBO (small bowel obstruction) ([email or web address] 12/23/2016    Clostridium difficile carrier 12/07/2016    Intractable abdominal pain 12/07/2016    History of colon cancer in adulthood 09/19/2016    Single current episode of major depressive disorder 09/16/2016        Date: 1/4/2017       Level of Christianity/Spiritual Activity:  []         Involved in [place]ach[place] tradition/spiritual practice    []         Not involved in [employer] tradition/spiritual practice  [x]         Spiritually oriented    []         Claims no spiritual orientation    []         seeking spiritual identity  []         Feels alienated from Restorationist practice/tradition  []         Feels angry about Restorationist practice/tradition  []         Spirituality/Restorationist tradition  a resource for coping at this time.   []         Not able to assess due to medical condition    Services Provided Today:  []         crisis intervention    []         reading Scriptures  [x]         spiritual assessment    []         prayer  [x]         empathic listening/emotional support  []         rites and rituals (cite in comments)  [x]         life review     []         Restorationist support  []         theological development   []         advocacy  []         ethical dialog     []         blessing  [x]         bereavement support    [x]         support to family  [x]         anticipatory grief support   []         help with AMD  []         spiritual guidance []         meditation      Spiritual Care Needs  []         Emotional Support  []         Spiritual/Catholic Care  []         Loss/Adjustment  []         Advocacy/Referral                /Ethics  [x]         No needs expressed at               this time  []         Other: (note in               comments)  [address]  []         Follow up visits with               pt/family  []         Provide materials  []         Schedule sacraments  []         Contact Community               Clergy  [x]         Follow up as needed  []         Other: (note in               comments)     Comments:  responded to page for death of pt. 4 family members present.  provided support to fam as they shared about the pt. Bereavement center pamphlet given to family. [facility] are aware of  availability. Karyna Sanchez M.S.   Spiritual Care Department  If need arise please call Marylu-CHARI ([phone])

## 2017-01-05 NOTE — PROGRESS NOTES
Ms. Kelle Spann has a PCA pump hooked up to her chest port. She is unable to push the button herself however, so family has been educated on how to push the button when she appears to be agitated and in pain and then to call for assistance as needed. She had 2 episodes of stool emesis today. She is receiving Zofran as needed for nausea sx. Family has been by her side through out the day. While she does not speak much, she has been able to answer when asked about if she is having pain and calmed with back rubs.

## 2017-01-05 NOTE — PROGRESS NOTES
1955: Pt in bed with absent breath and heart sounds. Family at bedside, condolences offered. PCA stopped. 0010: Post-mortem care provided. D/c PAC. Pt transported to [facility] by nurse at [facility], no personal belongings noted.

## 2017-01-05 NOTE — PROGRESS NOTES
Called to Pronounce Death    Patient  47yo in the hospital due to Metastatic Colon Cancer      PE: BP  00/00     HR 00     RR 00  HEENT: Pupils fixed, dilated, no response to light, no doll's eyes  Heart: no heart sounds  Lungs: no breathing sounds  Abdomen: no bowel sounds  Extremities: no pulses  Neuro: GCS 3, no DTR, no doll's eyes. Ervin  on 17   At 19:55  D/C Summary to be Dictated by Attending Physician.     Dr. Alison Flores.

## 2017-01-06 PROCEDURE — 0656 HSPC GENERAL INPATIENT

## 2017-01-07 PROCEDURE — 0656 HSPC GENERAL INPATIENT

## 2017-01-07 NOTE — DISCHARGE SUMMARY
Discharge Summary    [facility]  [facility] Those in Need  [phone]      Date of Admission: 1/1/2017  Date of Discharge: 1/4/2017    Lisa Bullock is a 51y.o. year old who was admitted to [facility] at [facility] with a Hospice diagnosis of Metastatic colon cancer;Colon cancer [facility]); Colon cancer (HC*. The patient's care was focused on comfort and the patient passed away on 1/4/2017.

## 2017-01-08 PROCEDURE — 0656 HSPC GENERAL INPATIENT

## 2017-01-09 VITALS
TEMPERATURE: 97.5 F | OXYGEN SATURATION: 97 % | SYSTOLIC BLOOD PRESSURE: 129 MMHG | RESPIRATION RATE: 16 BRPM | DIASTOLIC BLOOD PRESSURE: 87 MMHG | HEART RATE: 106 BPM

## 2017-01-09 PROCEDURE — 0656 HSPC GENERAL INPATIENT

## 2017-01-10 PROCEDURE — 0656 HSPC GENERAL INPATIENT

## 2017-01-11 PROCEDURE — 0656 HSPC GENERAL INPATIENT

## 2017-01-12 PROCEDURE — 0656 HSPC GENERAL INPATIENT

## 2017-01-13 ENCOUNTER — HOME CARE VISIT (OUTPATIENT)
Dept: HOSPICE | Facility: HOSPICE | Age: 47
End: 2017-01-13
Payer: MEDICAID

## 2017-01-13 PROCEDURE — 0656 HSPC GENERAL INPATIENT

## 2017-01-14 PROCEDURE — 0656 HSPC GENERAL INPATIENT

## 2017-01-15 PROCEDURE — 0656 HSPC GENERAL INPATIENT

## 2017-01-16 PROCEDURE — 0656 HSPC GENERAL INPATIENT

## 2017-01-17 PROCEDURE — 0656 HSPC GENERAL INPATIENT

## 2017-01-18 PROCEDURE — 0656 HSPC GENERAL INPATIENT

## 2017-01-19 PROCEDURE — 0656 HSPC GENERAL INPATIENT

## 2017-01-20 PROCEDURE — 0656 HSPC GENERAL INPATIENT

## 2017-01-21 PROCEDURE — 0656 HSPC GENERAL INPATIENT

## 2017-01-22 PROCEDURE — 0656 HSPC GENERAL INPATIENT

## 2017-01-23 PROCEDURE — 0656 HSPC GENERAL INPATIENT

## 2017-01-24 PROCEDURE — 0656 HSPC GENERAL INPATIENT

## 2017-01-25 PROCEDURE — 0656 HSPC GENERAL INPATIENT

## 2017-01-26 PROCEDURE — 0656 HSPC GENERAL INPATIENT

## 2017-01-27 PROCEDURE — 0656 HSPC GENERAL INPATIENT

## 2017-01-28 PROCEDURE — 0656 HSPC GENERAL INPATIENT

## 2017-01-29 PROCEDURE — 0656 HSPC GENERAL INPATIENT

## 2017-01-30 PROCEDURE — 0656 HSPC GENERAL INPATIENT

## 2022-06-29 NOTE — PROGRESS NOTES
GI Progress Note Ruth Banks  Carolina Ramon FGR:[id number] OBX:[id number]   ATTG: Rubina Simpson MD  PCP: Patria Deleon MD  Date/Time:  1/4/2017 8:27 AM   Assessment:   · SBO- related to metastatic CRC with peritoneal carcinomatosis and b/l ovarian mets  · Emesis- due to SBO     Plan:   · Agree with hospice  · No definite benefit to Somatostatin depot in this case as obstruction will be unchanged  · If has recurrent emesis place NGT to LIWS     Subjective:   Discussed with RN events overnight. Copious feculent emesis overnight    Complaint Y/N Description   Abdominal Pain     Hematemesis n    Hematochezia n    Melena n    Constipation     Diarrhea     Dyspepsia     Dysphagia     Jaundiced n    Nausea/vomiting y      Review of Systems:  Symptom Y/N Comments  Symptom Y/N Comments   Fever/Chills    Chest Pain     Cough    Headaches     Sputum    Joint Pain     SOB/REAL    Pruritis/Rash     Tolerating Diet    Other       Could NOT obtain due to:      Objective:   VITALS:   Last 24hrs VS reviewed since prior progress note. Most recent are:  Visit Vitals    /77 (BP 1 Location: Left arm)    Pulse (!) 108    Temp 97.4 °F (36.3 °C)    Resp 18    SpO2 95%     No intake or output data in the 24 hours ending 01/04/17 0827  PHYSICAL EXAM:  General: WD, WN. Obtunded,  no acute distress    HEENT: NC, Atraumatic. PERRL. Anicteric sclerae. Lungs:  CTA Bilaterally. No Wheezing/Rhonchi/Rales. Heart:  Tachy RR,   No murmur/Rubs/Gallops  Abdomen: Tight, +More Distended, +Tympanitic, +Tender.  +Some High-pitched BS  Extremities: No c/c/e  Neurologic:  CN 2-12 gi, Responsive to pain, moving spontaneously. No acute neurological distress   Psych:   Unable to assess insight. Not anxious nor agitated.     Lab and Radiology Data Reviewed: (see below)    Medications Reviewed: (see below)  PMH/SH reviewed - no change compared to H&P  ________________________________________________________________________  Total time spent with [FreeTextEntry1] : Ms. BOATENG  is a 65 Zimmer of Nephrology patient: 15 minutes ________________________________________________________________________  Care Plan discussed with:  Patient y   Family     RN y              Consultant:  mary ellen Monge MD     Procedures: see electronic medical records for all procedures/Xrays and details which were not copied into this note but were reviewed prior to creation of Plan. LABS:  No results for input(s): WBC, HGB, HCT, PLT, HGBEXT, HCTEXT, PLTEXT in the last 72 hours. No results for input(s): NA, K, CL, CO2, BUN, CREA, GLU, CA, MG, PHOS, URICA in the last 72 hours. No results for input(s): SGOT, GPT, AP, TBIL, TP, ALB, GLOB, GGT, AML, LPSE in the last 72 hours. No lab exists for component: AMYP, HLPSE  No results for input(s): INR, PTP, APTT in the last 72 hours. No lab exists for component: INREXT   No results for input(s): FE, TIBC, PSAT, FERR in the last 72 hours. No results found for: FOL, RBCF  No results for input(s): PH, PCO2, PO2 in the last 72 hours. No results for input(s): CPK, CKMB in the last 72 hours.     No lab exists for component: TROPONINI  Lab Results   Component Value Date/Time    Color YELLOW/STRAW 12/30/2016 10:08 PM    Appearance TURBID 12/30/2016 10:08 PM    Specific gravity 1.014 12/30/2016 10:08 PM    pH (UA) 7.5 12/30/2016 10:08 PM    Protein NEGATIVE  12/30/2016 10:08 PM    Glucose NEGATIVE  12/30/2016 10:08 PM    Ketone TRACE 12/30/2016 10:08 PM    Bilirubin NEGATIVE  12/30/2016 10:08 PM    Urobilinogen 0.2 12/30/2016 10:08 PM    Nitrites NEGATIVE  12/30/2016 10:08 PM    Leukocyte Esterase LARGE 12/30/2016 10:08 PM    Epithelial cells MODERATE 12/30/2016 10:08 PM    Bacteria 1+ 12/30/2016 10:08 PM    WBC >100 12/30/2016 10:08 PM    RBC 5-10 12/30/2016 10:08 PM       MEDICATIONS:  Current Facility-Administered Medications   Medication Dose Route Frequency    ALPRAZolam (XANAX) tablet 0.25 mg  0.25 mg Oral Q6H PRN    pantoprazole (PROTONIX) tablet 40 mg  40 mg Oral ACB    dexamethasone (DECADRON) tablet 2 mg  2 mg Oral Q12H    ondansetron (ZOFRAN) injection 4 mg  4 mg IntraVENous Q8H    HYDROmorphone (PF) (DILAUDID) injection 1 mg  1 mg IntraVENous Q1H PRN    HYDROmorphone (PF) 15 mg/30 ml (DILAUDID) PCA   IntraVENous CONTINUOUS    0.9% sodium chloride infusion  75 mL/hr IntraVENous CONTINUOUS    ondansetron (ZOFRAN) injection 4 mg  4 mg IntraVENous Q4H PRN    bisacodyl (DULCOLAX) suppository 10 mg  10 mg Rectal DAILY PRN    LORazepam (ATIVAN) injection 0.5 mg  0.5 mg IntraVENous Q1H PRN    sodium chloride (NS) flush 10 mL  10 mL InterCATHeter PRN    prochlorperazine (COMPAZINE) with saline injection 5 mg  5 mg IntraVENous Q6H PRN